# Patient Record
Sex: FEMALE | Race: BLACK OR AFRICAN AMERICAN | NOT HISPANIC OR LATINO | Employment: STUDENT | URBAN - METROPOLITAN AREA
[De-identification: names, ages, dates, MRNs, and addresses within clinical notes are randomized per-mention and may not be internally consistent; named-entity substitution may affect disease eponyms.]

---

## 2017-11-28 ENCOUNTER — ALLIED HEALTH/NURSE VISIT (OUTPATIENT)
Dept: NURSING | Facility: CLINIC | Age: 32
End: 2017-11-28

## 2017-11-28 DIAGNOSIS — Z23 NEED FOR PROPHYLACTIC VACCINATION AND INOCULATION AGAINST INFLUENZA: Primary | ICD-10-CM

## 2017-11-28 PROCEDURE — 90471 IMMUNIZATION ADMIN: CPT

## 2017-11-28 PROCEDURE — 90686 IIV4 VACC NO PRSV 0.5 ML IM: CPT

## 2017-11-28 PROCEDURE — 99207 ZZC NO CHARGE NURSE ONLY: CPT

## 2017-11-28 NOTE — MR AVS SNAPSHOT
"              After Visit Summary   2017    Johnna Maher    MRN: [id number]           Patient Information     Date Of Birth          1985        Visit Information        Provider Department      2017 12:15 PM CARE COORDINATOR [facility]        Today's Diagnoses     Need for prophylactic vaccination and inoculation against influenza    -  1       Follow-ups after your visit        Who to contact     If you have questions or need follow up information about today's clinic visit or your schedule please contact [facility] directly at [phone].  Normal or non-critical lab and imaging results will be communicated to you by Enmetric Systemshart, letter or phone within 4 business days after the clinic has received the results. If you do not hear from us within 7 days, please contact the clinic through SenseDatat or phone. If you have a critical or abnormal lab result, we will notify you by phone as soon as possible.  Submit refill requests through Sensory Networks or call your pharmacy and they will forward the refill request to us. Please allow 3 business days for your refill to be completed.          Additional Information About Your Visit        MyChart Information     Sensory Networks lets you send messages to your doctor, view your test results, renew your prescriptions, schedule appointments and more. To sign up, go to [email or web address] . Click on \"Log in\" on the left side of the screen, which will take you to the Welcome page. Then click on \"Sign up Now\" on the right side of the page.     You will be asked to enter the access code listed below, as well as some personal information. Please follow the directions to create your username and password.     Your access code is: [email or web address]  Expires: 2018 12:39 PM     Your access code will  in 90 days. If you need help or a new code, please call your [facility] or [phone].        Care " EveryWhere ID     This is your Care EveryWhere ID. This could be used by other organizations to access your [facility] medical records  [id number]         Blood Pressure from Last 3 Encounters:   No data found for BP    Weight from Last 3 Encounters:   No data found for Wt              We Performed the Following     ADMIN 1st VACCINE     HC FLU VAC PRESRV FREE QUAD SPLIT VIR 3+YRS IM        Primary Care Provider    None Specified       No primary provider on file.        Equal Access to Services     : Hadii aad ku hadasho Soomaali, waaxda luqadaha, qaybta kaalmada adeegyada, waxay idiin hayaan adeeg ajithkaysh la'aan . So [facility] [phone].    ATENCIÓN: Si habla español, tiene a arce disposición servicios gratuitos de asistencia lingüística. Llame al [phone].    We comply with applicable federal civil rights laws and [place] laws. We do not discriminate on the basis of race, color, national origin, age, disability, sex, sexual orientation, or gender identity.            Thank you!     Thank you for choosing [facility]  for your care. Our goal is always to provide you with excellent care. Hearing back from our patients is one way we can continue to improve our services. Please take a few minutes to complete the written survey that you may receive in the mail after your visit with us. Thank you!             Your Updated Medication List - Protect others around you: Learn how to safely use, store and throw away your medicines at www.disposemymeds.org.      Notice  As of 11/28/2017 12:39 PM    You have not been prescribed any medications.

## 2017-11-28 NOTE — PROGRESS NOTES

## 2017-12-12 ENCOUNTER — RADIANT APPOINTMENT (OUTPATIENT)
Dept: ULTRASOUND IMAGING | Facility: CLINIC | Age: 32
End: 2017-12-12
Payer: COMMERCIAL

## 2017-12-12 ENCOUNTER — OFFICE VISIT (OUTPATIENT)
Dept: OBGYN | Facility: CLINIC | Age: 32
End: 2017-12-12
Payer: COMMERCIAL

## 2017-12-12 VITALS
HEIGHT: 66 IN | SYSTOLIC BLOOD PRESSURE: 102 MMHG | BODY MASS INDEX: 31.85 KG/M2 | DIASTOLIC BLOOD PRESSURE: 70 MMHG | WEIGHT: 198.2 LBS

## 2017-12-12 DIAGNOSIS — R10.2 PELVIC PAIN IN FEMALE: Primary | ICD-10-CM

## 2017-12-12 DIAGNOSIS — R10.2 PELVIC PAIN: ICD-10-CM

## 2017-12-12 PROCEDURE — 99202 OFFICE O/P NEW SF 15 MIN: CPT | Performed by: NURSE PRACTITIONER

## 2017-12-12 PROCEDURE — 76830 TRANSVAGINAL US NON-OB: CPT | Performed by: OBSTETRICS & GYNECOLOGY

## 2017-12-12 ASSESSMENT — ANXIETY QUESTIONNAIRES
3. WORRYING TOO MUCH ABOUT DIFFERENT THINGS: NOT AT ALL
5. BEING SO RESTLESS THAT IT IS HARD TO SIT STILL: NOT AT ALL
2. NOT BEING ABLE TO STOP OR CONTROL WORRYING: NOT AT ALL
GAD7 TOTAL SCORE: 1
1. FEELING NERVOUS, ANXIOUS, OR ON EDGE: NOT AT ALL
7. FEELING AFRAID AS IF SOMETHING AWFUL MIGHT HAPPEN: NOT AT ALL
6. BECOMING EASILY ANNOYED OR IRRITABLE: SEVERAL DAYS
IF YOU CHECKED OFF ANY PROBLEMS ON THIS QUESTIONNAIRE, HOW DIFFICULT HAVE THESE PROBLEMS MADE IT FOR YOU TO DO YOUR WORK, TAKE CARE OF THINGS AT HOME, OR GET ALONG WITH OTHER PEOPLE: NOT DIFFICULT AT ALL

## 2017-12-12 ASSESSMENT — PATIENT HEALTH QUESTIONNAIRE - PHQ9
5. POOR APPETITE OR OVEREATING: NOT AT ALL
SUM OF ALL RESPONSES TO PHQ QUESTIONS 1-9: 3

## 2017-12-12 NOTE — MR AVS SNAPSHOT
"              After Visit Summary   2017    Johnna Maher    MRN: [id number]           Patient Information     Date Of Birth          1985        Visit Information        Provider Department      2017 10:00 AM Elizabeth Gaytan APRN CNP [facility]         Follow-ups after your visit        Follow-up notes from your care team     Return if symptoms worsen or fail to improve.      Who to contact     If you have questions or need follow up information about today's clinic visit or your schedule please contact [facility] directly at [phone].  Normal or non-critical lab and imaging results will be communicated to you by Benefit Mobilehart, letter or phone within 4 business days after the clinic has received the results. If you do not hear from us within 7 days, please contact the clinic through Benefit Mobilehart or phone. If you have a critical or abnormal lab result, we will notify you by phone as soon as possible.  Submit refill requests through Xipin or call your pharmacy and they will forward the refill request to us. Please allow 3 business days for your refill to be completed.          Additional Information About Your Visit        MyChart Information     Xipin lets you send messages to your doctor, view your test results, renew your prescriptions, schedule appointments and more. To sign up, go to [email or web address] . Click on \"Log in\" on the left side of the screen, which will take you to the Welcome page. Then click on \"Sign up Now\" on the right side of the page.     You will be asked to enter the access code listed below, as well as some personal information. Please follow the directions to create your username and password.     Your access code is: [email or web address]  Expires: 2018 12:39 PM     Your access code will  in 90 days. If you need help or a new code, please call your [facility] or [phone].        Care EveryWhere ID     " "This is your Care EveryWhere ID. This could be used by other organizations to access your [facility] medical records  [id number]        Your Vitals Were     Height Last Period BMI (Body Mass Index)             5' 5.5\" (1.664 m) (LMP Unknown) 32.48 kg/m2          Blood Pressure from Last 3 Encounters:   12/12/17 102/70    Weight from Last 3 Encounters:   12/12/17 198 lb 3.2 oz (89.9 kg)              Today, you had the following     No orders found for display       Primary Care Provider Office Phone # Fax #    [facility] [phone]       [facility] [address]  [facility] [address]  [facility] [address]        Equal Access to Services     ISAAC SUN : Hadii florian silvao Sodeaali, waaxda luqadaha, qaybta kaalmada adeegyada, arya murphy . So [facility] [phone].    ATENCIÓN: Si habla español, tiene a arce disposición servicios gratuitos de asistencia lingüística. Llame al [phone].    We comply with applicable federal civil rights laws and [place] laws. We do not discriminate on the basis of race, color, national origin, age, disability, sex, sexual orientation, or gender identity.            Thank you!     Thank you for choosing [facility]  for your care. Our goal is always to provide you with excellent care. Hearing back from our patients is one way we can continue to improve our services. Please take a few minutes to complete the written survey that you may receive in the mail after your visit with us. Thank you!             Your Updated Medication List - Protect others around you: Learn how to safely use, store and throw away your medicines at www.disposemymeds.org.      Notice  As of 12/12/2017 11:59 PM    You have not been prescribed any medications.      "

## 2017-12-12 NOTE — PROGRESS NOTES
"    SUBJECTIVE:                                                   Johnna Maher is a 32 year old female who presents to clinic today for the following health issue(s):  Patient presents with:  Pelvic Pain: patient states she has been having pelvic pain for the last 3 weeks. pain feels like labor pain and happens only at night time, denies any vaginal discharge, bleeding and no urinary symptoms      HPI: Patient been having pelvic pain at night for last 3 weeks.  Denies any vaginal or urinary symptons.  STates bowel movements are normal.      No LMP recorded (lmp unknown)..   Patient is sexually active, No obstetric history on file..  Using IUD for contraception.    reports that she has never smoked. She has never used smokeless tobacco.      STD testing offered?  declined    Health maintenance updated:  yes    Today's PHQ-2 Score: No flowsheet data found.  Today's PHQ-9 Score:   PHQ-9 SCORE 12/12/2017   Total Score 3     Today's NIXON-7 Score:   NIXON-7 SCORE 12/12/2017   Total Score 1       Problem list and histories reviewed & adjusted, as indicated.  Additional history: as documented.    There is no problem list on file for this patient.    No past surgical history on file.   Social History   Substance Use Topics     Smoking status: Never Smoker     Smokeless tobacco: Never Used     Alcohol use Not on file           No current outpatient prescriptions on file.     No current facility-administered medications for this visit.      No Known Allergies    ROS:  12 point review of systems negative other than symptoms noted below.    OBJECTIVE:     /70  Ht 5' 5.5\" (1.664 m)  Wt 198 lb 3.2 oz (89.9 kg)  LMP  (LMP Unknown)  BMI 32.48 kg/m2  Body mass index is 32.48 kg/(m^2).    Exam:  Pelvic Exam:  External Genitalia:     Normal appearance for age, no discharge present, no tenderness present, no inflammatory lesions present, color normal  Vagina:    Normal vaginal vault without central or paravaginal defects, " no discharge present, no inflammatory lesions present, no masses present  Bladder:     Nontender to palpation  Urethra:   Urethral Body:  Urethra palpation normal, urethra structural support normal   Urethral Meatus:  No erythema or lesions present  Cervix:     Appearance healthy, no lesions present, nontender to palpation, no bleeding present, string present  Uterus:     Nontender to palpation, no masses present, position anteflexed, mobility: normal  Adnexa:     No adnexal tenderness present, no adnexal masses present  Perineum:     Perineum within normal limits, no evidence of trauma, no rashes or skin lesions present  Anus:     Anus within normal limits, no hemorrhoids present  Inguinal Lymph Nodes:     No lymphadenopathy present  Pubic Hair:     Normal pubic hair distribution for age  Genitalia and Groin:     No rashes present, no lesions present, no areas of discoloration, no masses present  Discussed US results with patient.  Gynecological Ultrasonography:   Uterus: anteverted  Size: 9.45 x 7.11 x 5.86cm.    Findings: Normal   Endometrium: Thickness total 10.07mm  Findings: IUD in place  Right Ovary: 3.22 x 2.26 x 2.93cm.    Findings: Right collapsed hemorrhagic cyst= 2.2x 1.8x 1.2cm  Left Ovary: 2.35 x 2.11 x 1.79cm.   Cul de Sac/Pouch of Rupesh: No FF       Impression:  IUD centrally located, Collapsing right hemorrhagic cyst     Clarence Handley MD         In-Clinic Test Results:  No results found for this or any previous visit (from the past 24 hour(s)).    ASSESSMENT/PLAN:                                                      No diagnosis found.    There are no Patient Instructions on file for this visit.    Ibuprofen, heating pad.  Return if pain persisting.    [facility]

## 2017-12-13 ASSESSMENT — ANXIETY QUESTIONNAIRES: GAD7 TOTAL SCORE: 1

## 2018-01-02 ENCOUNTER — TRANSFERRED RECORDS (OUTPATIENT)
Dept: HEALTH INFORMATION MANAGEMENT | Facility: CLINIC | Age: 33
End: 2018-01-02

## 2018-05-05 ENCOUNTER — RADIANT APPOINTMENT (OUTPATIENT)
Dept: GENERAL RADIOLOGY | Facility: CLINIC | Age: 33
End: 2018-05-05
Attending: FAMILY MEDICINE
Payer: COMMERCIAL

## 2018-05-05 ENCOUNTER — OFFICE VISIT (OUTPATIENT)
Dept: URGENT CARE | Facility: URGENT CARE | Age: 33
End: 2018-05-05
Payer: COMMERCIAL

## 2018-05-05 VITALS
TEMPERATURE: 98.4 F | DIASTOLIC BLOOD PRESSURE: 82 MMHG | OXYGEN SATURATION: 100 % | BODY MASS INDEX: 32.28 KG/M2 | HEART RATE: 76 BPM | SYSTOLIC BLOOD PRESSURE: 116 MMHG | WEIGHT: 197 LBS

## 2018-05-05 DIAGNOSIS — R10.9 FLANK PAIN: ICD-10-CM

## 2018-05-05 DIAGNOSIS — R10.9 FLANK PAIN: Primary | ICD-10-CM

## 2018-05-05 DIAGNOSIS — D69.1 ABNORMAL PLATELETS (H): ICD-10-CM

## 2018-05-05 DIAGNOSIS — K59.01 SLOW TRANSIT CONSTIPATION: ICD-10-CM

## 2018-05-05 LAB
ALBUMIN SERPL-MCNC: 3.8 G/DL (ref 3.4–5)
ALBUMIN UR-MCNC: NEGATIVE MG/DL
ALP SERPL-CCNC: 53 U/L (ref 40–150)
ALT SERPL W P-5'-P-CCNC: 27 U/L (ref 0–50)
ANION GAP SERPL CALCULATED.3IONS-SCNC: 10 MMOL/L (ref 3–14)
APPEARANCE UR: CLEAR
AST SERPL W P-5'-P-CCNC: 14 U/L (ref 0–45)
BASOPHILS # BLD AUTO: 0 10E9/L (ref 0–0.2)
BASOPHILS NFR BLD AUTO: 0.1 %
BILIRUB SERPL-MCNC: 0.5 MG/DL (ref 0.2–1.3)
BILIRUB UR QL STRIP: NEGATIVE
BUN SERPL-MCNC: 10 MG/DL (ref 7–30)
CALCIUM SERPL-MCNC: 9.3 MG/DL (ref 8.5–10.1)
CHLORIDE SERPL-SCNC: 106 MMOL/L (ref 94–109)
CO2 SERPL-SCNC: 25 MMOL/L (ref 20–32)
COLOR UR AUTO: YELLOW
CREAT SERPL-MCNC: 0.71 MG/DL (ref 0.52–1.04)
CRP SERPL-MCNC: 17 MG/L (ref 0–8)
DIFFERENTIAL METHOD BLD: ABNORMAL
EOSINOPHIL # BLD AUTO: 0.1 10E9/L (ref 0–0.7)
EOSINOPHIL NFR BLD AUTO: 1 %
ERYTHROCYTE [DISTWIDTH] IN BLOOD BY AUTOMATED COUNT: 14.9 % (ref 10–15)
ERYTHROCYTE [SEDIMENTATION RATE] IN BLOOD BY WESTERGREN METHOD: 18 MM/H (ref 0–20)
GFR SERPL CREATININE-BSD FRML MDRD: >90 ML/MIN/1.7M2
GLUCOSE SERPL-MCNC: 93 MG/DL (ref 70–99)
GLUCOSE UR STRIP-MCNC: NEGATIVE MG/DL
HCT VFR BLD AUTO: 33.4 % (ref 35–47)
HGB BLD-MCNC: 11.7 G/DL (ref 11.7–15.7)
HGB UR QL STRIP: NEGATIVE
KETONES UR STRIP-MCNC: NEGATIVE MG/DL
LEUKOCYTE ESTERASE UR QL STRIP: ABNORMAL
LIPASE SERPL-CCNC: 101 U/L (ref 73–393)
LYMPHOCYTES # BLD AUTO: 2.3 10E9/L (ref 0.8–5.3)
LYMPHOCYTES NFR BLD AUTO: 28.3 %
MCH RBC QN AUTO: 25.7 PG (ref 26.5–33)
MCHC RBC AUTO-ENTMCNC: 35 G/DL (ref 31.5–36.5)
MCV RBC AUTO: 73 FL (ref 78–100)
MONOCYTES # BLD AUTO: 0.8 10E9/L (ref 0–1.3)
MONOCYTES NFR BLD AUTO: 9.9 %
NEUTROPHILS # BLD AUTO: 5 10E9/L (ref 1.6–8.3)
NEUTROPHILS NFR BLD AUTO: 60.7 %
NITRATE UR QL: NEGATIVE
NON-SQ EPI CELLS #/AREA URNS LPF: NORMAL /LPF
PH UR STRIP: 7.5 PH (ref 5–7)
PLATELET # BLD AUTO: 218 10E9/L (ref 150–450)
POTASSIUM SERPL-SCNC: 4.3 MMOL/L (ref 3.4–5.3)
PROT SERPL-MCNC: 8 G/DL (ref 6.8–8.8)
RBC # BLD AUTO: 4.55 10E12/L (ref 3.8–5.2)
RBC #/AREA URNS AUTO: NORMAL /HPF
SODIUM SERPL-SCNC: 141 MMOL/L (ref 133–144)
SOURCE: ABNORMAL
SP GR UR STRIP: 1.02 (ref 1–1.03)
SPECIMEN SOURCE: ABNORMAL
UROBILINOGEN UR STRIP-ACNC: 0.2 EU/DL (ref 0.2–1)
WBC # BLD AUTO: 8.2 10E9/L (ref 4–11)
WBC #/AREA URNS AUTO: NORMAL /HPF
WET PREP SPEC: ABNORMAL

## 2018-05-05 PROCEDURE — 86140 C-REACTIVE PROTEIN: CPT | Performed by: FAMILY MEDICINE

## 2018-05-05 PROCEDURE — 83690 ASSAY OF LIPASE: CPT | Performed by: FAMILY MEDICINE

## 2018-05-05 PROCEDURE — 36415 COLL VENOUS BLD VENIPUNCTURE: CPT | Performed by: FAMILY MEDICINE

## 2018-05-05 PROCEDURE — 80053 COMPREHEN METABOLIC PANEL: CPT | Performed by: FAMILY MEDICINE

## 2018-05-05 PROCEDURE — 74019 RADEX ABDOMEN 2 VIEWS: CPT

## 2018-05-05 PROCEDURE — 81001 URINALYSIS AUTO W/SCOPE: CPT | Performed by: PHYSICIAN ASSISTANT

## 2018-05-05 PROCEDURE — 85652 RBC SED RATE AUTOMATED: CPT | Performed by: FAMILY MEDICINE

## 2018-05-05 PROCEDURE — 85025 COMPLETE CBC W/AUTO DIFF WBC: CPT | Performed by: FAMILY MEDICINE

## 2018-05-05 PROCEDURE — 87210 SMEAR WET MOUNT SALINE/INK: CPT | Performed by: PHYSICIAN ASSISTANT

## 2018-05-05 PROCEDURE — 99204 OFFICE O/P NEW MOD 45 MIN: CPT | Performed by: FAMILY MEDICINE

## 2018-05-05 NOTE — MR AVS SNAPSHOT
After Visit Summary   5/5/2018    Johnna Maher    MRN: [id number]           Patient Information     Date Of Birth          1985        Visit Information        Provider Department      5/5/2018 9:00 AM Anyi Quintero MD [facility]        Today's Diagnoses     Flank pain    -  1      Care Instructions      Constipation (Adult)  Constipation means that you have bowel movements that are less frequent than usual. Stools often become very hard and difficult to pass.  Constipation is very common. At some point in life it affects almost everyone. Since everyone's bowel habits are different, what is constipation to one person may not be to another. Your healthcare provider may do tests to diagnose constipation. It depends on what he or she finds when evaluating you.    Symptoms of constipation include:    Abdominal pain    Bloating    Vomiting    Painful bowel movements    Itching, swelling, bleeding, or pain around the anus  Causes  Constipation can have many causes. These include:    Diet low in fiber    Too much dairy    Not drinking enough liquids    Lack of exercise or physical activity. This is especially true for older adults.    Changes in lifestyle or daily routine, including pregnancy, aging, work, and travel    Frequent use or misuse of laxatives    Ignoring the urge to have a bowel movement or delaying it until later    Medicines, such as certain prescription pain medicines, iron supplements, antacids, certain antidepressants, and calcium supplements    Diseases like irritable bowel syndrome, bowel obstructions, stroke, diabetes, thyroid disease, Parkinson disease, hemorrhoids, and colon cancer  Complications  Potential complications of constipation can include:    Hemorrhoids    Rectal bleeding from hemorrhoids or anal fissures (skin tears)    Hernias    Dependency on laxatives    Chronic constipation    Fecal impaction    Bowel obstruction or  perforation  Home care  All treatment should be done after talking with your healthcare provider. This is especially true if you have another medical problems, are taking prescription medicines, or are an older adult. Treatment most often involves lifestyle changes. You may also need medicines. Your healthcare provider will tell you which will work best for you. Follow the advice below to help avoid this problem in the future.  Lifestyle changes  These lifestyle changes can help prevent constipation:    Diet. Eat a high-fiber diet, with fresh fruit and vegetables, and reduce dairy intake, meats, and processed foods    Fluids. It's important to get enough fluids each day. Drink plenty of water when you eat more fiber. If you are on diet that limits the amount of fluid you can have, talk about this with your healthcare provider.    Regular exercise. Check with your healthcare provider first.  Medicines  Take any medicines as directed. Some laxatives are safe to use only every now and then. Others can be taken on a regular basis. Talk with your doctor or pharmacist if you have questions.  Prescription pain medicines can cause constipation. If you are taking this kind of medicine, ask your healthcare provider if you should also take a stool softener.  Medicines you may take to treat constipation include:    Fiber supplements    Stool softeners    Laxatives    Enemas    Rectal suppositories  Follow-up care  Follow up with your healthcare provider if symptoms don't get better in the next few days. You may need to have more tests or see a specialist.  Call 911  Call 911 if any of these occur:    Trouble breathing    Stiff, rigid abdomen that is severely painful to touch    Confusion    Fainting or loss of consciousness    Rapid heart rate    Chest pain  When to seek medical advice  Call your healthcare provider right away if any of these occur:    Fever of 100.4 F (38 C) or higher, or as directed by your healthcare  provider    Failure to resume normal bowel movements    Pain in your abdomen or back gets worse    Nausea or vomiting    Swelling in your abdomen    Blood in the stool    Black, tarry stool    Involuntary weight loss    Weakness  Date Last Reviewed: 12/30/2015 2000-2017 [employer]. [address] [place] [address], [place] [address]. All rights reserved. This information is not intended as a substitute for professional medical care. Always follow your healthcare professional's instructions.        Treating Constipation    Constipation is a common and often uncomfortable problem. Constipation means you have bowel movements fewer than 3 times per week, or strain to pass hard, dry stool. It can last a short time. Or it can be a problem that never seems to go away. The good news is that it can often be treated and controlled.  Eat more fiber  One of the best ways to help treat constipation is to increase your fiber intake. You can do this either through diet or by using fiber supplements. Fiber (in whole grains, fruits, and vegetables) adds bulk and absorbs water to soften the stool. This helps the stool pass through the colon more easily. When you increase your fiber intake, do it slowly to avoid side effects such as bloating. Also increase the amount of water that you drink. Eating more of the following foods can add fiber to your diet.    High-fiber cereals    Whole grains, bran, and brown rice    Vegetables such as carrots, broccoli, and greens    Fresh fruits (especially apples, pears, and dried fruits like raisins and apricots)    Nuts and legumes (especially beans such as lentils, kidney beans, and lima beans)  Get physically active  Exercise helps improve the working of your colon which helps ease constipation. Try to get some physical activity every day. If you haven t been active for a while, talk to your healthcare provider before starting again.  Laxatives  Your healthcare provider may suggest an  "over-the-counter product to help ease your constipation. He or she may suggest the use of bulk-forming agents or laxatives. The use of laxatives, if used as directed, is common and safe. Follow directions carefully when using them. See your healthcare provider for new-onset constipation, or long-term constipation, to rule out other causes such as medicines or thyroid disease.  Date Last Reviewed: 7/1/2016 2000-2017 [employer]. [address] [place] [address], [place] [address]. All rights reserved. This information is not intended as a substitute for professional medical care. Always follow your healthcare professional's instructions.                Follow-ups after your visit        Who to contact     If you have questions or need follow up information about today's clinic visit or your schedule please contact [facility] directly at [phone].  Normal or non-critical lab and imaging results will be communicated to you by MyChart, letter or phone within 4 business days after the clinic has received the results. If you do not hear from us within 7 days, please contact the clinic through untapthart or phone. If you have a critical or abnormal lab result, we will notify you by phone as soon as possible.  Submit refill requests through MIGSIF or call your pharmacy and they will forward the refill request to us. Please allow 3 business days for your refill to be completed.          Additional Information About Your Visit        MyChart Information     MIGS[employer] lets you send messages to your doctor, view your test results, renew your prescriptions, schedule appointments and more. To sign up, go to [email or web address] . Click on \"Log in\" on the left side of the screen, which will take you to the Welcome page. Then click on \"Sign up Now\" on the right side of the page.     You will be asked to enter the access code listed below, as well as some personal information. Please follow the " directions to create your username and password.     Your access code is: [email or web address]  Expires: 8/3/2018 10:39 AM     Your access code will  in 90 days. If you need help or a new code, please call your [facility] clinic or [phone].        Care EveryWhere ID     This is your Care EveryWhere ID. This could be used by other organizations to access your [facility] medical records  [id number]        Your Vitals Were     Pulse Temperature Last Period Pulse Oximetry BMI (Body Mass Index)       76 98.4  F (36.9  C) (Tympanic) 2018 100% 32.28 kg/m2        Blood Pressure from Last 3 Encounters:   18 116/82   17 102/70    Weight from Last 3 Encounters:   18 197 lb (89.4 kg)   17 198 lb 3.2 oz (89.9 kg)              We Performed the Following     *UA reflex to Microscopic and Culture ([place] and [facility] (except [place] and [place])     CBC with platelets differential     Comprehensive metabolic panel     CRP inflammation     Erythrocyte sedimentation rate auto     Lipase     Urine Microscopic     Wet prep        Primary Care Provider Office Phone # Fax #    [facility] [phone]       [facility] [address]  [facility] [address]  [facility] [address]        Equal Access to Services     ISAAC SUN : Hadii aad ku hadasho Soomaali, waaxda luqadaha, qaybta kaalmada adeegyada, arya holguin. So [facility] [phone].    ATENCIÓN: Si habla español, tiene a arce disposición servicios gratuitos de asistencia lingüística. Llame al [phone].    We comply with applicable federal civil rights laws and [place] laws. We do not discriminate on the basis of race, color, national origin, age, disability, sex, sexual orientation, or gender identity.            Thank you!     Thank you for choosing [facility]  for your care. Our goal is always to provide you with excellent care.  Hearing back from our patients is one way we can continue to improve our services. Please take a few minutes to complete the written survey that you may receive in the mail after your visit with us. Thank you!             Your Updated Medication List - Protect others around you: Learn how to safely use, store and throw away your medicines at www.disposemymeds.org.      Notice  As of 5/5/2018 10:40 AM    You have not been prescribed any medications.

## 2018-05-05 NOTE — PROGRESS NOTES
SUBJECTIVE:    Chief Complaint   Patient presents with     Urgent Care     Pt in clinic c/o right side flank/abdominal pain for 2 days.     Flank Pain     Abdominal Pain     HPI:  Johnna Maher is a 32 year old female who presents with the CC of abdominal / right flank pain.    Pain is located in the RUQ and right side area, with radiation to None and back.  The pain is characterized as aching and cramping,   Pain at worst is a level 6 on a scale of 1-10.   Pain has been present for 2 day(s) and is slowly progressive.  EXACERBATING FACTORS: movement/walking and bending  RELIEVING FACTORS: nothing.  ASSOCIATED SX: none.  Patient denies nausea, vomiting, diarrhea, constipation, bloating, melena, hematochezia, dysuria, frequency, fever, chills, myalgias and headache     Last time the patient passed stool- yesterday, formed  Last time the patient urinated- today, no dysuria  Last time the patient was eating/ drinking -today, normal appetite    Surgical History :      none  Colon or bowel surgery -no  Diverticulitis history - no  Endometriosis -no    She had a fall about 10 days ago with 3 days of back pain, that resolved.  She associates the current pain with a long time she was waiting at a bus stop 2 days ago       PMH:  No history of chronic health conditions    ALLERGIES:  Review of patient's allergies indicates no known allergies.      No current outpatient prescriptions on file prior to visit.  No current facility-administered medications on file prior to visit.     Social History   Substance Use Topics     Smoking status: Never Smoker     Smokeless tobacco: Never Used     Alcohol use Not on file     Family History:  Non-contributory,  No associated family health conditions    Review Of Systems    Constitutional:  Negative for fevers, chills, fatigue  Skin: negative for rash or lesions  Eyes: negative for eye pain, visual changes  Ears/Nose/Throat: negative for earache  , sinus trouble, persistent sore  throat  Respiratory: No shortness of breath, dyspnea on exertion     Cardiovascular: negative for, palpitations, tachycardia   Gastrointestinal: negative for vomiting, abdominal pain and diarrhea  Genitourinary: negative for dysuria and hematuria  Back: negative for    CVA pain-  Previous back pain resolved  Musculoskeletal: negative for generalized joint pain, joint swelling and joint stiffness  Neurologic: negative for generalized or local weakness or incoordination       OBJECTIVE:  /82  Pulse 76  Temp 98.4  F (36.9  C) (Tympanic)  Wt 197 lb (89.4 kg)  LMP 04/30/2018  SpO2 100%  BMI 32.28 kg/m2  GENERAL APPEARANCE: alert, moderate distress and cooperative  EYES: EOMI,  PERRL, conjunctiva clear  HENT: ear canals and TM's normal.  Nose and mouth without ulcers, erythema or lesions  NECK: supple, nontender, no lymphadenopathy  RESP: lungs clear to auscultation - no rales, rhonchi or wheezes  CV: regular rates and rhythm, normal S1 S2, no murmur noted  ABDOMEN: obese, soft, normal bowel sounds, tenderness moderate RUQ and right flank  NEURO: Normal strength and tone, sensory exam grossly normal,  normal speech and mentation  SKIN: no suspicious lesions or rashes      Labs:    Results for orders placed or performed in visit on 05/05/18   *UA reflex to Microscopic and Culture ([place] [facility] (except [place] and [place])   Result Value Ref Range    Color Urine Yellow     Appearance Urine Clear     Glucose Urine Negative NEG^Negative mg/dL    Bilirubin Urine Negative NEG^Negative    Ketones Urine Negative NEG^Negative mg/dL    Specific Gravity Urine 1.020 1.003 - 1.035    Blood Urine Negative NEG^Negative    pH Urine 7.5 (H) 5.0 - 7.0 pH    Protein Albumin Urine Negative NEG^Negative mg/dL    Urobilinogen Urine 0.2 0.2 - 1.0 EU/dL    Nitrite Urine Negative NEG^Negative    Leukocyte Esterase Urine Trace (A) NEG^Negative    Source Midstream Urine    Urine Microscopic   Result Value Ref Range     WBC Urine 0 - 5 OTO5^0 - 5 /HPF    RBC Urine O - 2 OTO2^O - 2 /HPF    Squamous Epithelial /LPF Urine Few FEW^Few /LPF   CBC with platelets differential   Result Value Ref Range    WBC 8.2 4.0 - 11.0 10e9/L    RBC Count 4.55 3.8 - 5.2 10e12/L    Hemoglobin 11.7 11.7 - 15.7 g/dL    Hematocrit 33.4 (L) 35.0 - 47.0 %    MCV 73 (L) 78 - 100 fl    MCH 25.7 (L) 26.5 - 33.0 pg    MCHC 35.0 31.5 - 36.5 g/dL    RDW 14.9 10.0 - 15.0 %    Platelet Count 218 150 - 450 10e9/L    Diff Method Automated Method     % Neutrophils 60.7 %    % Lymphocytes 28.3 %    % Monocytes 9.9 %    % Eosinophils 1.0 %    % Basophils 0.1 %    Absolute Neutrophil 5.0 1.6 - 8.3 10e9/L    Absolute Lymphocytes 2.3 0.8 - 5.3 10e9/L    Absolute Monocytes 0.8 0.0 - 1.3 10e9/L    Absolute Eosinophils 0.1 0.0 - 0.7 10e9/L    Absolute Basophils 0.0 0.0 - 0.2 10e9/L   Erythrocyte sedimentation rate auto   Result Value Ref Range    Sed Rate 18 0 - 20 mm/h   Wet prep   Result Value Ref Range    Specimen Description Vagina     Wet Prep Yeast seen (A)     Wet Prep No clue cells seen     Wet Prep No Trichomonas seen      On CBC an abnormal pattern was noted of platelet size        Abdominal x-ray: No obstruction, no free air,  No dilated bowel,  increased amount of stool, especially right side    ASSESSMENT:  Flank pain     - *UA reflex to Microscopic and Culture ([facility] [place] (except [place] and [place])  - Wet prep  - Urine Microscopic  - Comprehensive metabolic panel  - Lipase  - XR Abdomen 2 Views; Future  - CBC with platelets differential  - Erythrocyte sedimentation rate auto  - CRP inflammation       We discussed some of the many possible causes of abdominal pain including appendicitis, cholecystitis, diverticulitis, hepatitis, colitis, gastritis,  A stone in the gallbladder, pancreas, kidney or ureter, constipation, obstructed bowel, kidney or bladder infection, cancers, UTI,        We also discussed the limited capacity to evaluate these  conditions in the urgent care.  The patient requested to have the evaluation at urgent care, not at the ER, understanding the limitations of diagnostic capabilities    Discussed that the lab results did not identify the cause of the abdominal pain, but the testing has confirmed that  He/she does not have some important causes of pain  Labs indicate unlikely urinary tract/ kidney infection or stone,  There is unlikely significant intraabdominal infection like appendicitis, cholecystitis or diverticulitis,  no bowel obstruction, no significant constipation.  Testing for inflammation of liver, gallbladder and pancreas is pending.       We discussed that the exams performed today do NOT have the characteristics of an illness requiring emergent evaluation and treatment in an Emergency Department and hospital  Patient was counselled that if the abdominal symptoms worsen, especially with worsening pain, associated fever, chills, and/or vomiting with inability to keep down foods and liquids, blood in the urine, stool or vomitus  that they should be re-evaluated in the Emergency Department.          Slow transit constipation  Recommend stool softening with fruit/ vegetables or OTC products to see if pain improves    Abnormal platelets (H)   Discussed with patient that platelet abnormality is likely unrelated to the current symptoms,  May be a hereditary condition  Recommend recheck cbc in 2-3 months,  If persistent platelet abnormalities she should have pathology/ hematology evaluate her blood smear

## 2018-05-05 NOTE — PATIENT INSTRUCTIONS
Constipation (Adult)  Constipation means that you have bowel movements that are less frequent than usual. Stools often become very hard and difficult to pass.  Constipation is very common. At some point in life it affects almost everyone. Since everyone's bowel habits are different, what is constipation to one person may not be to another. Your healthcare provider may do tests to diagnose constipation. It depends on what he or she finds when evaluating you.    Symptoms of constipation include:    Abdominal pain    Bloating    Vomiting    Painful bowel movements    Itching, swelling, bleeding, or pain around the anus  Causes  Constipation can have many causes. These include:    Diet low in fiber    Too much dairy    Not drinking enough liquids    Lack of exercise or physical activity. This is especially true for older adults.    Changes in lifestyle or daily routine, including pregnancy, aging, work, and travel    Frequent use or misuse of laxatives    Ignoring the urge to have a bowel movement or delaying it until later    Medicines, such as certain prescription pain medicines, iron supplements, antacids, certain antidepressants, and calcium supplements    Diseases like irritable bowel syndrome, bowel obstructions, stroke, diabetes, thyroid disease, Parkinson disease, hemorrhoids, and colon cancer  Complications  Potential complications of constipation can include:    Hemorrhoids    Rectal bleeding from hemorrhoids or anal fissures (skin tears)    Hernias    Dependency on laxatives    Chronic constipation    Fecal impaction    Bowel obstruction or perforation  Home care  All treatment should be done after talking with your healthcare provider. This is especially true if you have another medical problems, are taking prescription medicines, or are an older adult. Treatment most often involves lifestyle changes. You may also need medicines. Your healthcare provider will tell you which will work best for you. Follow  the advice below to help avoid this problem in the future.  Lifestyle changes  These lifestyle changes can help prevent constipation:    Diet. Eat a high-fiber diet, with fresh fruit and vegetables, and reduce dairy intake, meats, and processed foods    Fluids. It's important to get enough fluids each day. Drink plenty of water when you eat more fiber. If you are on diet that limits the amount of fluid you can have, talk about this with your healthcare provider.    Regular exercise. Check with your healthcare provider first.  Medicines  Take any medicines as directed. Some laxatives are safe to use only every now and then. Others can be taken on a regular basis. Talk with your doctor or pharmacist if you have questions.  Prescription pain medicines can cause constipation. If you are taking this kind of medicine, ask your healthcare provider if you should also take a stool softener.  Medicines you may take to treat constipation include:    Fiber supplements    Stool softeners    Laxatives    Enemas    Rectal suppositories  Follow-up care  Follow up with your healthcare provider if symptoms don't get better in the next few days. You may need to have more tests or see a specialist.  Call 911  Call 911 if any of these occur:    Trouble breathing    Stiff, rigid abdomen that is severely painful to touch    Confusion    Fainting or loss of consciousness    Rapid heart rate    Chest pain  When to seek medical advice  Call your healthcare provider right away if any of these occur:    Fever of 100.4 F (38 C) or higher, or as directed by your healthcare provider    Failure to resume normal bowel movements    Pain in your abdomen or back gets worse    Nausea or vomiting    Swelling in your abdomen    Blood in the stool    Black, tarry stool    Involuntary weight loss    Weakness  Date Last Reviewed: 12/30/2015 2000-2017 [employer]. [address] [place] [address], [place] [address]. All rights reserved. This  information is not intended as a substitute for professional medical care. Always follow your healthcare professional's instructions.        Treating Constipation    Constipation is a common and often uncomfortable problem. Constipation means you have bowel movements fewer than 3 times per week, or strain to pass hard, dry stool. It can last a short time. Or it can be a problem that never seems to go away. The good news is that it can often be treated and controlled.  Eat more fiber  One of the best ways to help treat constipation is to increase your fiber intake. You can do this either through diet or by using fiber supplements. Fiber (in whole grains, fruits, and vegetables) adds bulk and absorbs water to soften the stool. This helps the stool pass through the colon more easily. When you increase your fiber intake, do it slowly to avoid side effects such as bloating. Also increase the amount of water that you drink. Eating more of the following foods can add fiber to your diet.    High-fiber cereals    Whole grains, bran, and brown rice    Vegetables such as carrots, broccoli, and greens    Fresh fruits (especially apples, pears, and dried fruits like raisins and apricots)    Nuts and legumes (especially beans such as lentils, kidney beans, and lima beans)  Get physically active  Exercise helps improve the working of your colon which helps ease constipation. Try to get some physical activity every day. If you haven t been active for a while, talk to your healthcare provider before starting again.  Laxatives  Your healthcare provider may suggest an over-the-counter product to help ease your constipation. He or she may suggest the use of bulk-forming agents or laxatives. The use of laxatives, if used as directed, is common and safe. Follow directions carefully when using them. See your healthcare provider for new-onset constipation, or long-term constipation, to rule out other causes such as medicines or thyroid  disease.  Date Last Reviewed: 7/1/2016 2000-2017 [employer]. [address] [place] [address], [place] [address]. All rights reserved. This information is not intended as a substitute for professional medical care. Always follow your healthcare professional's instructions.

## 2018-05-08 ENCOUNTER — HEALTH MAINTENANCE LETTER (OUTPATIENT)
Age: 33
End: 2018-05-08

## 2018-05-09 ENCOUNTER — OFFICE VISIT (OUTPATIENT)
Dept: OBGYN | Facility: CLINIC | Age: 33
End: 2018-05-09
Payer: COMMERCIAL

## 2018-05-09 VITALS
BODY MASS INDEX: 32.82 KG/M2 | HEART RATE: 68 BPM | WEIGHT: 197 LBS | DIASTOLIC BLOOD PRESSURE: 58 MMHG | SYSTOLIC BLOOD PRESSURE: 100 MMHG | HEIGHT: 65 IN

## 2018-05-09 DIAGNOSIS — Z01.419 ENCOUNTER FOR GYNECOLOGICAL EXAMINATION WITHOUT ABNORMAL FINDING: Primary | ICD-10-CM

## 2018-05-09 PROCEDURE — 99395 PREV VISIT EST AGE 18-39: CPT | Performed by: NURSE PRACTITIONER

## 2018-05-09 PROCEDURE — G0145 SCR C/V CYTO,THINLAYER,RESCR: HCPCS | Performed by: NURSE PRACTITIONER

## 2018-05-09 PROCEDURE — G0476 HPV COMBO ASSAY CA SCREEN: HCPCS | Performed by: NURSE PRACTITIONER

## 2018-05-09 ASSESSMENT — ANXIETY QUESTIONNAIRES
5. BEING SO RESTLESS THAT IT IS HARD TO SIT STILL: NOT AT ALL
GAD7 TOTAL SCORE: 0
6. BECOMING EASILY ANNOYED OR IRRITABLE: NOT AT ALL
IF YOU CHECKED OFF ANY PROBLEMS ON THIS QUESTIONNAIRE, HOW DIFFICULT HAVE THESE PROBLEMS MADE IT FOR YOU TO DO YOUR WORK, TAKE CARE OF THINGS AT HOME, OR GET ALONG WITH OTHER PEOPLE: NOT DIFFICULT AT ALL
2. NOT BEING ABLE TO STOP OR CONTROL WORRYING: NOT AT ALL
1. FEELING NERVOUS, ANXIOUS, OR ON EDGE: NOT AT ALL
3. WORRYING TOO MUCH ABOUT DIFFERENT THINGS: NOT AT ALL
7. FEELING AFRAID AS IF SOMETHING AWFUL MIGHT HAPPEN: NOT AT ALL

## 2018-05-09 ASSESSMENT — PATIENT HEALTH QUESTIONNAIRE - PHQ9: 5. POOR APPETITE OR OVEREATING: NOT AT ALL

## 2018-05-09 NOTE — MR AVS SNAPSHOT
"              After Visit Summary   5/9/2018    Johnna Maher    MRN: [id number]           Patient Information     Date Of Birth          1985        Visit Information        Provider Department      5/9/2018 9:30 AM Elizabeth Gaytan APRN CNP [facility]        Today's Diagnoses     Encounter for gynecological examination without abnormal finding    -  1       Follow-ups after your visit        Follow-up notes from your care team     Return in about 1 year (around 5/9/2019) for Routine Visit.      Who to contact     If you have questions or need follow up information about today's clinic visit or your schedule please contact [facility] directly at [phone].  Normal or non-critical lab and imaging results will be communicated to you by [employer], letter or phone within 4 business days after the clinic has received the results. If you do not hear from us within 7 days, please contact the clinic through [employer] or phone. If you have a critical or abnormal lab result, we will notify you by phone as soon as possible.  Submit refill requests through [employer] or call your pharmacy and they will forward the refill request to us. Please allow 3 business days for your refill to be completed.          Additional Information About Your Visit        MyChart Information     [employer] gives you secure access to your electronic health record. If you see a primary care provider, you can also send messages to your care team and make appointments. If you have questions, please call your primary care clinic.  If you do not have a primary care provider, please call [phone] and they will assist you.        Care EveryWhere ID     This is your Care EveryWhere ID. This could be used by other organizations to access your [place] medical records  [id number]        Your Vitals Were     Pulse Height Last Period BMI (Body Mass Index)          68 5' 5\" (1.651 m) 04/28/2018 32.78 " kg/m2         Blood Pressure from Last 3 Encounters:   05/09/18 100/58   05/05/18 116/82   12/12/17 102/70    Weight from Last 3 Encounters:   05/09/18 197 lb (89.4 kg)   05/05/18 197 lb (89.4 kg)   12/12/17 198 lb 3.2 oz (89.9 kg)              We Performed the Following     HPV High Risk Types DNA Cervical     Pap imaged thin layer screen with HPV - recommended age 30 - 65        Primary Care Provider Office Phone # Fax #    [facility] [phone]       [facility] [address]  [facility] [address]  [facility] [address]        Equal Access to Services     ISAAC SUN : Hadii florian silvao Mack, waaxda lukymberlyadaha, qaybta kaalmada aderobert, arya holguin. So [facility] [phone].    ATENCIÓN: Si habla español, tiene a arce disposición servicios gratuitos de asistencia lingüística. Llame al [phone].    We comply with applicable federal civil rights laws and [place] laws. We do not discriminate on the basis of race, color, national origin, age, disability, sex, sexual orientation, or gender identity.            Thank you!     Thank you for choosing [facility]  for your care. Our goal is always to provide you with excellent care. Hearing back from our patients is one way we can continue to improve our services. Please take a few minutes to complete the written survey that you may receive in the mail after your visit with us. Thank you!             Your Updated Medication List - Protect others around you: Learn how to safely use, store and throw away your medicines at www.disposemymeds.org.      Notice  As of 5/9/2018 10:11 AM    You have not been prescribed any medications.

## 2018-05-09 NOTE — PROGRESS NOTES
"  Johnna is a 32 year old No obstetric history on file. female who presents for annual exam.     Besides routine health maintenance, she has no other health concerns today .    HPI: here for annual exam, no other concerns today.    The patient's PCP is [facility].        GYNECOLOGIC HISTORY:    Patient's last menstrual period was 04/28/2018.  Her current contraception method is: IUD.  She  reports that she has never smoked. She has never used smokeless tobacco.    Patient is sexually active.  STD testing offered?  Declined  Last PHQ-9 score on record =   PHQ-9 SCORE 5/9/2018   Total Score 0     Last GAD7 score on record =   NIXON-7 SCORE 5/9/2018   Total Score 0     Alcohol Score = 0    HEALTH MAINTENANCE:  Cholesterol: (No results found for: CHOL   Last Mammo: never, Result: not applicable, Next Mammo: age.42.    Pap: (No results found for: PAP  Colonoscopy:  never, Result: not applicable, Next Colonoscopy: due at age 52.  Dexa:  Na     Health maintenance updated:  yes    HISTORY:  Obstetric History     No data available          There is no problem list on file for this patient.    No past surgical history on file.   Social History   Substance Use Topics     Smoking status: Never Smoker     Smokeless tobacco: Never Used     Alcohol use Not on file           No current outpatient prescriptions on file.     No current facility-administered medications for this visit.      No Known Allergies    Past medical, surgical, social and family histories were reviewed and updated in EPIC.    ROS:       EXAM:  /58  Pulse 68  Ht 5' 5\" (1.651 m)  Wt 197 lb (89.4 kg)  LMP 04/28/2018  BMI 32.78 kg/m2   BMI: Body mass index is 32.78 kg/(m^2).    PHYSICAL EXAM:  Constitutional:  Appearance: Well nourished, well developed, alert, in no acute distress  Neck:  Lymph Nodes:  No lymphadenopathy present    Thyroid:  Gland size normal, nontender, no nodules or masses present  on " palpation  Chest:  Respiratory Effort:  Breathing unlabored  Cardiovascular:    Heart: Auscultation:  Regular rate, normal rhythm, no murmurs present  Breasts: Inspection of Breasts:  No lymphadenopathy present., Palpation of Breasts and Axillae:  No masses present on palpation, no breast tenderness., Axillary Lymph Nodes:  No lymphadenopathy present. and No nodularity, asymmetry or nipple discharge bilaterally. taught SBE  Gastrointestinal:   Abdominal Examination:  Abdomen nontender to palpation, tone normal without rigidity or guarding, no masses present, umbilicus without lesions   Liver and Spleen:  No hepatomegaly present, liver nontender to palpation    Hernias:  No hernias present  Lymphatic: Lymph Nodes:  No other lymphadenopathy present  Skin:  General Inspection:  No rashes present, no lesions present, no areas of  discoloration    Genitalia and Groin:  No rashes present, no lesions present, no areas of  discoloration, no masses present  Neurologic/Psychiatric:    Mental Status:  Oriented X3     Pelvic Exam:  External Genitalia:     Normal appearance for age, no discharge present, no tenderness present, no inflammatory lesions present, color normal  Vagina:     Normal vaginal vault without central or paravaginal defects, no discharge present, no inflammatory lesions present, no masses present  Bladder:     Nontender to palpation  Urethra:   Urethral Body:  Urethra palpation normal, urethra structural support normal   Urethral Meatus:  No erythema or lesions present  Cervix:     Appearance healthy, no lesions present, nontender to palpation, no bleeding present  Uterus:     Uterus: firm, normal sized and nontender, midplane in position.   Adnexa:     No adnexal tenderness present, no adnexal masses present  Perineum:     Perineum within normal limits, no evidence of trauma, no rashes or skin lesions present  Anus:     Anus within normal limits, no hemorrhoids present  Inguinal Lymph Nodes:     No  lymphadenopathy present  Pubic Hair:     Normal pubic hair distribution for age  Genitalia and Groin:     No rashes present, no lesions present, no areas of discoloration, no masses present      COUNSELING:   Reviewed preventive health counseling, as reflected in patient instructions       Regular exercise       Healthy diet/nutrition       Contraception       Safe sex practices/STD prevention    BMI: Body mass index is 32.78 kg/(m^2).      ASSESSMENT:  32 year old female with satisfactory annual exam.    ICD-10-CM    1. Encounter for gynecological examination without abnormal finding Z01.419 Pap imaged thin layer screen with HPV - recommended age 30 - 65     HPV High Risk Types DNA Cervical       PLAN:  Normal Gyn exam.  Return prn or 1 year for annual and pap smear.    Elizabeth Gaytan, IKE CNP

## 2018-05-10 ASSESSMENT — PATIENT HEALTH QUESTIONNAIRE - PHQ9: SUM OF ALL RESPONSES TO PHQ QUESTIONS 1-9: 0

## 2018-05-10 ASSESSMENT — ANXIETY QUESTIONNAIRES: GAD7 TOTAL SCORE: 0

## 2018-05-11 LAB
COPATH REPORT: NORMAL
PAP: NORMAL

## 2018-05-15 LAB
FINAL DIAGNOSIS: NORMAL
HPV HR 12 DNA CVX QL NAA+PROBE: NEGATIVE
HPV16 DNA SPEC QL NAA+PROBE: NEGATIVE
HPV18 DNA SPEC QL NAA+PROBE: NEGATIVE
SPECIMEN DESCRIPTION: NORMAL
SPECIMEN SOURCE CVX/VAG CYTO: NORMAL

## 2018-10-27 ENCOUNTER — RADIANT APPOINTMENT (OUTPATIENT)
Dept: GENERAL RADIOLOGY | Facility: CLINIC | Age: 33
End: 2018-10-27
Attending: FAMILY MEDICINE
Payer: COMMERCIAL

## 2018-10-27 ENCOUNTER — OFFICE VISIT (OUTPATIENT)
Dept: URGENT CARE | Facility: URGENT CARE | Age: 33
End: 2018-10-27
Payer: COMMERCIAL

## 2018-10-27 ENCOUNTER — NURSE TRIAGE (OUTPATIENT)
Dept: NURSING | Facility: CLINIC | Age: 33
End: 2018-10-27

## 2018-10-27 VITALS
HEART RATE: 68 BPM | WEIGHT: 190 LBS | DIASTOLIC BLOOD PRESSURE: 76 MMHG | TEMPERATURE: 97.9 F | SYSTOLIC BLOOD PRESSURE: 105 MMHG | BODY MASS INDEX: 31.62 KG/M2 | OXYGEN SATURATION: 100 %

## 2018-10-27 DIAGNOSIS — R04.2 HEMOPTYSIS: Primary | ICD-10-CM

## 2018-10-27 PROCEDURE — 71046 X-RAY EXAM CHEST 2 VIEWS: CPT

## 2018-10-27 PROCEDURE — 99213 OFFICE O/P EST LOW 20 MIN: CPT | Performed by: FAMILY MEDICINE

## 2018-10-27 NOTE — TELEPHONE ENCOUNTER
" calling for wife \"Every morning she coughs up pinkish sputum\".  Caller puts wife on phone for triage, wife said sputum looks dark brown with bloody streaks.  She denies any pink, frothy sputum.    Gave callers UC locations and times of operation.     Reason for Disposition    Coughing up rubi-colored sputum    Additional Information    Negative: Severe difficulty breathing (e.g., struggling for each breath, speaks in single words)    Negative: [1] Chest pain AND [2] difficulty breathing    Negative: Bluish lips, tongue, or face now    Negative: Passed out (i.e., lost consciousness, collapsed and was not responding)    Negative: Shock suspected (e.g., cold/pale/clammy skin, too weak to stand, low BP, rapid pulse)    Negative: Difficult to awaken or acting confused  (e.g., disoriented, slurred speech)    Negative: Recent chest injury (i.e., past 24 hours)    Negative: [1] Coughed up blood AND [2] large amount (example: \"a cup of blood\")    Negative: Sounds like a life-threatening emergency to the triager    Negative: Difficulty breathing    Negative: Chest pain    Negative: Unclear to triager if the patient is coughing up blood or vomiting blood    Negative: History of prior \"blood clot\" in leg or lungs (i.e., deep vein thrombosis, pulmonary embolism)    Negative: History of inherited increased risk of blood clots (e.g., Factor 5 Leiden, Anti-thrombin 3, Protein C or Protein S deficiency, Prothrombin mutation)    Negative: Pregnant or pregnant in past month    Negative: Hip or leg fracture in past 2 months (e.g., or had cast on leg or ankle)    Negative: Prolonged travel within the last month  (e.g., long bus trip or plane trip)    Negative: Bedridden (e.g., nursing home patient, CVA, chronic illness, recovering from surgery)    Negative: Patient sounds very sick or weak to the triager    Negative: [1] Coughed up blood AND [2] > 1 tablespoon (15 ml) (Exception: blood-tinged sputum)    Negative: Fever > 103 F " (39.4 C)    Negative: [1] Fever > 101 F (38.3 C) AND [2] age > 60    Negative: [1] Fever > 100.5 F (38.1 C) AND [2] diabetes mellitus or weak immune system (e.g., HIV positive, cancer chemo, splenectomy, organ transplant, chronic steroids)    Negative: [1] Has underlying lung disease (e.g., COPD, chronic bronchitis or emphysema) AND    [2] sputum has turned yellow or green in color    Protocols used: COUGHING UP BLOOD-ADULT-    Argenis Morejon RN  [facility]

## 2018-10-27 NOTE — MR AVS SNAPSHOT
After Visit Summary   10/27/2018    Johnna Maher    MRN: [id number]           Patient Information     Date Of Birth          1985        Visit Information        Provider Department      10/27/2018 10:45 AM Yang Aviles MD [facility]        Today's Diagnoses     Hemoptysis    -  1       Follow-ups after your visit        Additional Services     OTOLARYNGOLOGY REFERRAL       Your provider has referred you to: [facility] (Otolaryngology) - [place] [phone]  [email or web address]  [facility] [phone]   [email or web address]    Please be aware that coverage of these services is subject to the terms and limitations of your health insurance plan.  Call member services at your health plan with any benefit or coverage questions.      Please bring the following with you to your appointment:    (1) Any X-Rays, CTs or MRIs which have been performed.  Contact the facility where they were done to arrange for  prior to your scheduled appointment.   (2) List of current medications  (3) This referral request   (4) Any documents/labs given to you for this referral                  Who to contact     If you have questions or need follow up information about today's clinic visit or your schedule please contact [facility] directly at [phone].  Normal or non-critical lab and imaging results will be communicated to you by MyChart, letter or phone within 4 business days after the clinic has received the results. If you do not hear from us within 7 days, please contact the clinic through MyChart or phone. If you have a critical or abnormal lab result, we will notify you by phone as soon as possible.  Submit refill requests through [employer] or call your pharmacy and they will  forward the refill request to us. Please allow 3 business days for your refill to be completed.          Additional Information About Your Visit        [facility] Information     [employer] gives you secure access to your electronic health record. If you see a primary care provider, you can also send messages to your care team and make appointments. If you have questions, please call your primary care clinic.  If you do not have a primary care provider, please call [phone] and they will assist you.        Care EveryWhere ID     This is your Care EveryWhere ID. This could be used by other organizations to access your [place] medical records  [id number]        Your Vitals Were     Pulse Temperature Pulse Oximetry BMI (Body Mass Index)          68 97.9  F (36.6  C) (Tympanic) 100% 31.62 kg/m2         Blood Pressure from Last 3 Encounters:   10/27/18 105/76   05/09/18 100/58   05/05/18 116/82    Weight from Last 3 Encounters:   10/27/18 190 lb (86.2 kg)   05/09/18 197 lb (89.4 kg)   05/05/18 197 lb (89.4 kg)              We Performed the Following     OTOLARYNGOLOGY REFERRAL     XR Chest 2 Views        Primary Care Provider Office Phone # Fax #    [facility] [place] [phone]       [place] [address]  [facility] [address]  [facility] [address]        Equal Access to Services     ISAAC SUN : Hadii florian ku hadasho Sodeaali, waaxda luqadaha, qaybta kaalmada izabel, arya holguin. So [facility] [phone].    ATENCIÓN: Si habla español, tiene a arce disposición servicios gratuitos de asistencia lingüística. Guevara al [phone].    We comply with applicable federal civil rights laws and [place] laws. We do not discriminate on the basis of race, color, national origin, age, disability, sex, sexual orientation, or gender identity.            Thank you!     Thank you for choosing [facility]  for your care. Our goal  is always to provide you with excellent care. Hearing back from our patients is one way we can continue to improve our services. Please take a few minutes to complete the written survey that you may receive in the mail after your visit with us. Thank you!             Your Updated Medication List - Protect others around you: Learn how to safely use, store and throw away your medicines at www.disposemymeds.org.      Notice  As of 10/27/2018 11:39 AM    You have not been prescribed any medications.

## 2018-10-27 NOTE — PROGRESS NOTES
Subjective: For the last 4 or 5 months, patient has noticed that every morning she gets some mucus up that is brown and sometimes bloody, does not taste bad, does not really bring anything up the rest of the day, has a little congestion lately from the change in weather but this is happening all through the summer.  She has not lost weight, appetite is okay, no unusual cough.  Her throat is not sore.  She moved here from [place] about 15 months ago.  Her  and 2 children are healthy.  No chance of pregnancy.    Objective: ENT exam is entirely normal.  Neck is normal.  Lungs are clear.  Heart is regular without murmurs.  Abdomen benign.  Chest x-ray was done and appears to show some old granulomas on the left, but I want to see what radiology thinks..  This certainly would rule out active TB if they think the granulomas are old..    Assessment and plan: Long lasting hemoptysis and I suspect the problem is something ENT could probably sort out.  Referral is done.  If the radiologist read the chest x-ray is suspicious I would order a CAT scan to further evaluate

## 2018-10-29 DIAGNOSIS — R04.2 HEMOPTYSIS: Primary | ICD-10-CM

## 2018-11-02 ENCOUNTER — TRANSFERRED RECORDS (OUTPATIENT)
Dept: HEALTH INFORMATION MANAGEMENT | Facility: CLINIC | Age: 33
End: 2018-11-02

## 2018-12-27 NOTE — TELEPHONE ENCOUNTER
FUTURE VISIT INFORMATION      FUTURE VISIT INFORMATION:    Date: 12.31.18     Time: 7:00 AM    Location: [facility]  REFERRAL INFORMATION:    Referring provider:      Referring providers clinic:      Reason for visit/diagnosis  Hemoptsysis    RECORDS REQUESTED FROM:       Clinic name Comments Records Status Imaging Status   Ear, nose and throat specialty care  [facility]                                      RECORDS RECEIVED FROM:    DATE RECEIVED: 12.31.18   NOTES STATUS DETAILS   OFFICE NOTE from referring provider N/A    OFFICE NOTE from other specialist Received 11.2.18 ENT specialty care   DISCHARGE SUMMARY from hospital N/A    DISCHARGE REPORT from the ER [facility] urgent [facility]   OPERATIVE REPORT N/A    MEDICATION LIST Internal    IMAGING  (NEED IMAGES AND REPORTS)     CT SCAN N/A    CHEST XRAY (CXR) Internal 10.27.18   TESTS     PULMONARY FUNCTION TESTING (PFT) In process Scheduled for 12.31.18   FLOW LOOP VOLUME (FVL) In process Scheduled for 12.31.18   CYSTIC FIBROSIS     CF SPUTUM CULTURE N/A

## 2018-12-31 ENCOUNTER — OFFICE VISIT (OUTPATIENT)
Dept: PULMONOLOGY | Facility: CLINIC | Age: 33
End: 2018-12-31
Attending: INTERNAL MEDICINE
Payer: COMMERCIAL

## 2018-12-31 ENCOUNTER — PRE VISIT (OUTPATIENT)
Dept: PULMONOLOGY | Facility: CLINIC | Age: 33
End: 2018-12-31

## 2018-12-31 ENCOUNTER — TELEPHONE (OUTPATIENT)
Dept: PULMONOLOGY | Facility: CLINIC | Age: 33
End: 2018-12-31

## 2018-12-31 ENCOUNTER — ANCILLARY PROCEDURE (OUTPATIENT)
Dept: CT IMAGING | Facility: CLINIC | Age: 33
End: 2018-12-31
Attending: INTERNAL MEDICINE
Payer: COMMERCIAL

## 2018-12-31 VITALS
BODY MASS INDEX: 29.98 KG/M2 | DIASTOLIC BLOOD PRESSURE: 78 MMHG | RESPIRATION RATE: 16 BRPM | HEIGHT: 67 IN | HEART RATE: 71 BPM | WEIGHT: 191 LBS | SYSTOLIC BLOOD PRESSURE: 109 MMHG | OXYGEN SATURATION: 99 %

## 2018-12-31 DIAGNOSIS — R04.2 HEMOPTYSIS: ICD-10-CM

## 2018-12-31 DIAGNOSIS — R04.2 HEMOPTYSIS: Primary | ICD-10-CM

## 2018-12-31 DIAGNOSIS — R91.1 LUNG NODULE: ICD-10-CM

## 2018-12-31 PROCEDURE — G0463 HOSPITAL OUTPT CLINIC VISIT: HCPCS | Mod: ZF

## 2018-12-31 PROCEDURE — 31624 DX BRONCHOSCOPE/LAVAGE: CPT | Mod: ZF | Performed by: INTERNAL MEDICINE

## 2018-12-31 SDOH — HEALTH STABILITY: MENTAL HEALTH: HOW OFTEN DO YOU HAVE A DRINK CONTAINING ALCOHOL?: NEVER

## 2018-12-31 ASSESSMENT — MIFFLIN-ST. JEOR: SCORE: 1614

## 2018-12-31 ASSESSMENT — PAIN SCALES - GENERAL: PAINLEVEL: NO PAIN (0)

## 2018-12-31 NOTE — Clinical Note
Please call pt with CT results -- normal except for infectious/inflammatory looking nodule in RML.  Recommend bronch w/ BAL and airway exam for hemoptysis (already discussed w/ patient and ).  Should be able to add on for Weds 1/2 with bronch staff of the day.  Let me know if questions.  Bronch is ordered.  Thanks.  Cindy

## 2018-12-31 NOTE — TELEPHONE ENCOUNTER
"Contacted patient to schedule Bronchoscopy per Dr. Walker. Patient will come in on 1/2/19- 10:30 arrival time (11:30 start) for Bronchoscopy with lavage. I prepped the patient and also discussed the exam information with her  (per the patients request). Patient is aware that she will need a . Per patients request, I emailed the bronchoscopy prep sheet along with the appointment information. Patient and  expressed understanding of prep instructions and will arrive at the endoscopy suite 1/2/19 at 10:30 am. They have been instructed to call in with further questions.     \"Here is the information for your Bronchoscopy on Wednesday 1/2.      Please arrive for your exam at 10:30 am.     Location:     [place] [address]   (Check in on 1st floor- Endoscopy suite)    Reminders:  -You will need a  on the day of the exam (you will be lightly sedated)  -Stop taking over the counter pain-killers: Advil, Aleve on the day before your exam (Tylenol is ok)  -Stop food and drink (including water) 6 hours before exam.     Attached is a document with everything you need to know about your upcoming Bronchoscopy.    Please let me know if you have any questions!\"    Also shared results of Chest CT per Dr. Walker's request, \"Right middle lobe 9 mm groundglass nodule, which may be  inflammatory/infectious in nature, and is likely benign given young  age. A few additional tiny indeterminate solid nodules. Fleischner  Society follow-up guidelines do not apply due to young age.  Recommend bronchoscopy with airway exam and RML BAL to eval for hemoptysis.  Repeat CT in 6 months to ensure no change in visualized nodule.\" Patient understands results and agrees to plan.  "

## 2018-12-31 NOTE — LETTER
12/31/2018       RE: Johnna Maher  [address]  [facility] [address]     Dear Colleague,    Thank you for referring your patient, Johnna Maher, to the [facility] at [facility]. Please see a copy of my visit note below.    Pulmonary Clinic New Patient Consult  Reason for Consult: Hemoptysis  History of Present Illness    Ms. Maher is a 33 yof with no significant past medical history who presents to pulmonary clinic for further evaluation of hemoptysis.  She is accompanied to clinic today by her .  She describes her hemoptysis is blood-tinged sputum which only occurs in the morning.  It feels as if the phlegm is coming from the back of her throat. The sputum is mostly brownish tinged.  She denies ever having kenrick hemoptysis.  This has previously occurred on a few occasions.  It occurred on her first winter here.  Then recurred over this last spring and summer stopped for a few months and is now going on again for the last few months.  She is never had large volume hemoptysis.  She denies any fever, cough during the day, shortness of breath at rest, or dyspnea with exertion's.  Her weight has been stable.  She denies any problems with recurrent pneumonias or other sinopulmonary infections.  She denies any history of sinus issues.  She denies any significant or recurrent epistaxis.  She has no history of asthma or other underlying lung diseases that she knows.  She denies any known exposure to tuberculosis.    She lives in [place] and has no pets at home.  She is currently a student.  She denies any known exposure to asbestos.  She further denies any significant exposure to birds, recent travel outside the area, or hot tub or Jacuzzi she uses on a regular basis.  She does believe that she has some pillows or comfort or stuff with down feathers at home.    She describes occasional heartburn.  She eats  dinner nightly between 10 and 11 PM and goes to bed very shortly thereafter.    She denies any family history of lung diseases.    Review of records suggests she was previously seen by her primary care provider for the same complaint in October of this year.  She had a chest x-ray on October 27 which was without any acute cardiopulmonary abnormalities.  She was referred to ENT.  She had normal visualized laryngoscopy with no obvious source of upper airway bleeding.  Bleeding from more obscure sources, such as small capillaries could not be definitively excluded.  Consideration of referral to GI and pulmonary was suggested at that time.    Review of Systems:  10 of 14 systems reviewed and are negative unless otherwise stated in HPI.    Past Medical History:   Diagnosis Date     History of ovarian cyst      IUD (intrauterine device) in place        History reviewed. No pertinent surgical history.    History reviewed. No pertinent family history.    Social History     Socioeconomic History     Marital status:      Spouse name: None     Number of children: None     Years of education: None     Highest education level: None   Social Needs     Financial resource strain: None     Food insecurity - worry: None     Food insecurity - inability: None     Transportation needs - medical: None     Transportation needs - non-medical: None   Occupational History     None   Tobacco Use     Smoking status: Never Smoker     Smokeless tobacco: Never Used   Substance and Sexual Activity     Alcohol use: No     Frequency: Never     Drug use: No     Sexual activity: Yes     Partners: Male     Birth control/protection: IUD     Comment: IUD from [place], inserted August 2017   Other Topics Concern     Parent/sibling w/ CABG, MI or angioplasty before 65F 55M? Not Asked   Social History Narrative     None       No Known Allergies    No current outpatient medications on file.      Physical Exam:  /78   Pulse 71   Resp 16   Ht  "1.702 m (5' 7\")   Wt 86.6 kg (191 lb)   SpO2 99%   BMI 29.91 kg/m     GENERAL: Well developed, well nourished, alert, and in no apparent distress.  HEENT: Normocephalic, atraumatic. PERRL, EOMI. Oral mucosa is moist. No perioral cyanosis.  NECK: supple, no masses, no thyromegaly.  RESP:  Normal respiratory effort.  CTAB.  No rales, wheezes, rhonchi.  No cyanosis or clubbing.  CV: Normal S1, S2, regular rhythm, normal rate. No murmur.  No LE edema.   ABDOMEN:  Soft, non-tender, non-distended.   SKIN: warm and dry. No rash.  NEURO: AAOx3.  Normal gait.  No focal neuro deficits.  PSYCH: mentation appears normal. and affect normal/bright    Results:  PFTs: Ratio 83%, FVC 84%, FEV1 83%, TLC 82%, RV 90%, DLCO 113%.  Testing did not meet ATS criteria for FVC or total lung capacity, despite this limitation testing reveals normal pulmonary mechanics with normal gas exchange.  There is no prior study available for comparison.  Imaging (personally reviewed in clinic today):  CXR 10/27: as described in HPI.      Assessment and Plan:   Johnna Maher is a 33 year old female with no significant past medical history who presents to pulmonary clinic today for further evaluation and management of hemoptysis.  Hemoptysis has been relatively long-standing, and recurs intermittently.  It is mostly characterized by scant amount of blood-tinged sputum which is brought up first thing in the morning and does not persist throughout the day.  The patient denies ever having any large volume or kenrick hemoptysis.  At this point, the next best step would be to obtain CT of the chest to look for more subtle abnormalities which could be causing hemoptysis that were missed on chest x-ray.  We will plan to obtain this in the near future and will follow up on the results with the patient and her  to discuss next steps.  Most likely she will need bronchoscopy with airway exam and BAL to exclude endobronchial lesions or other causes of " hemoptysis that could be of concern.  We will discuss the utility of this further pending results of her CT chest.  Based on the fact that the hemoptysis is very mild, and only occurs in the morning upon waking, I am suspicious that this could be possibly related to nocturnal acid reflux.  This is further supported by her history of intermittent dyspepsia as well as eating dinner and lying down for bed and very near proximity to each other.  If this is the case, lifestyle modifications aimed at reducing reflux +/- a trial of acid suppressing medications could be discussed.  Follow through on referral to GI, as suggested by ENT could also be entertained.  The above findings and plan were discussed with Ms. Maher and her . Questions and concerns were answered to theirsatisfaction.  she was provided with my contact information should new questions or concerns arise in the interim.  She is up to date on a seasonal influenza vaccine.    Cindy Walker MD  Pulmonary and Critical Care Medicine    The above note was dictated using voice recognition software and may include typographical errors. Please contact the author for any clarifications.                                      Pulmonary Clinic New Patient Consult  Reason for Consult: Hemoptysis  History of Present Illness    Ms. Maher is a 33 yof with no significant past medical history who presents to pulmonary clinic for further evaluation of hemoptysis.  She is accompanied to clinic today by her .  She describes her hemoptysis is blood-tinged sputum which only occurs in the morning.  It feels as if the phlegm is coming from the back of her throat. The sputum is mostly brownish tinged.  She denies ever having kenrick hemoptysis.  This has previously occurred on a few occasions.  It occurred on her first winter here.  Then recurred over this last spring and summer stopped for a few months and is now going on again for the last few months.  She  is never had large volume hemoptysis.  She denies any fever, cough during the day, shortness of breath at rest, or dyspnea with exertion's.  Her weight has been stable.  She denies any problems with recurrent pneumonias or other sinopulmonary infections.  She denies any history of sinus issues.  She denies any significant or recurrent epistaxis.  She has no history of asthma or other underlying lung diseases that she knows.  She denies any known exposure to tuberculosis.    She lives in [place] and has no pets at home.  She is currently a student.  She denies any known exposure to asbestos.  She further denies any significant exposure to birds, recent travel outside the area, or hot tub or Jacuzzi she uses on a regular basis.  She does believe that she has some pillows or comfort or stuff with down feathers at home.    She describes occasional heartburn.  She eats dinner nightly between 10 and 11 PM and goes to bed very shortly thereafter.    She denies any family history of lung diseases.    Review of records suggests she was previously seen by her primary care provider for the same complaint in October of this year.  She had a chest x-ray on October 27 which was without any acute cardiopulmonary abnormalities.  She was referred to ENT.  She had normal visualized laryngoscopy with no obvious source of upper airway bleeding.  Bleeding from more obscure sources, such as small capillaries could not be definitively excluded.  Consideration of referral to GI and pulmonary was suggested at that time.    Review of Systems:  10 of 14 systems reviewed and are negative unless otherwise stated in HPI.    Past Medical History:   Diagnosis Date     History of ovarian cyst      IUD (intrauterine device) in place        History reviewed. No pertinent surgical history.    History reviewed. No pertinent family history.    Social History     Socioeconomic History     Marital status:      Spouse name: None     Number of  "children: None     Years of education: None     Highest education level: None   Social Needs     Financial resource strain: None     Food insecurity - worry: None     Food insecurity - inability: None     Transportation needs - medical: None     Transportation needs - non-medical: None   Occupational History     None   Tobacco Use     Smoking status: Never Smoker     Smokeless tobacco: Never Used   Substance and Sexual Activity     Alcohol use: No     Frequency: Never     Drug use: No     Sexual activity: Yes     Partners: Male     Birth control/protection: IUD     Comment: IUD from [place], inserted August 2017   Other Topics Concern     Parent/sibling w/ CABG, MI or angioplasty before 65F 55M? Not Asked   Social History Narrative     None       No Known Allergies    No current outpatient medications on file.      Physical Exam:  /78   Pulse 71   Resp 16   Ht 1.702 m (5' 7\")   Wt 86.6 kg (191 lb)   SpO2 99%   BMI 29.91 kg/m     GENERAL: Well developed, well nourished, alert, and in no apparent distress.  HEENT: Normocephalic, atraumatic. PERRL, EOMI. Oral mucosa is moist. No perioral cyanosis.  NECK: supple, no masses, no thyromegaly.  RESP:  Normal respiratory effort.  CTAB.  No rales, wheezes, rhonchi.  No cyanosis or clubbing.  CV: Normal S1, S2, regular rhythm, normal rate. No murmur.  No LE edema.   ABDOMEN:  Soft, non-tender, non-distended.   SKIN: warm and dry. No rash.  NEURO: AAOx3.  Normal gait.  No focal neuro deficits.  PSYCH: mentation appears normal. and affect normal/bright    Results:  PFTs: Ratio 83%, FVC 84%, FEV1 83%, TLC 82%, RV 90%, DLCO 113%.  Testing did not meet ATS criteria for FVC or total lung capacity, despite this limitation testing reveals normal pulmonary mechanics with normal gas exchange.  There is no prior study available for comparison.  Imaging (personally reviewed in clinic today):  CXR 10/27: as described in HPI.      Assessment and Plan:   Johnna Maher is a " 33 year old female with no significant past medical history who presents to pulmonary clinic today for further evaluation and management of hemoptysis.  Hemoptysis has been relatively long-standing, and recurs intermittently.  It is mostly characterized by scant amount of blood-tinged sputum which is brought up first thing in the morning and does not persist throughout the day.  The patient denies ever having any large volume or kenrick hemoptysis.  At this point, the next best step would be to obtain CT of the chest to look for more subtle abnormalities which could be causing hemoptysis that were missed on chest x-ray.  We will plan to obtain this in the near future and will follow up on the results with the patient and her  to discuss next steps.  Most likely she will need bronchoscopy with airway exam and BAL to exclude endobronchial lesions or other causes of hemoptysis that could be of concern.  We will discuss the utility of this further pending results of her CT chest.  Based on the fact that the hemoptysis is very mild, and only occurs in the morning upon waking, I am suspicious that this could be possibly related to nocturnal acid reflux.  This is further supported by her history of intermittent dyspepsia as well as eating dinner and lying down for bed and very near proximity to each other.  If this is the case, lifestyle modifications aimed at reducing reflux +/- a trial of acid suppressing medications could be discussed.  Follow through on referral to GI, as suggested by ENT could also be entertained.  The above findings and plan were discussed with Ms. Maher and her . Questions and concerns were answered to theirsatisfaction.  she was provided with my contact information should new questions or concerns arise in the interim.  She is up to date on a seasonal influenza vaccine.    Cindy Walker MD  Pulmonary and Critical Care Medicine    The above note was dictated using voice  recognition software and may include typographical errors. Please contact the author for any clarifications.  ### Addendum:  CT Results:  1. Right middle lobe 9 mm groundglass nodule, which may be  inflammatory/infectious in nature, and is likely benign given young  age. A few additional tiny indeterminate solid nodules. Fleischner  Society follow-up guidelines do not apply due to young age.  Recommend bronchoscopy with airway exam and RML BAL to eval for hemoptysis.  Repeat CT in 6 months to ensure no change in visualized nodule.                                    Again, thank you for allowing me to participate in the care of your patient.      Sincerely,    Yanna Walker MD

## 2018-12-31 NOTE — PROGRESS NOTES
Pulmonary Clinic New Patient Consult  Reason for Consult: Hemoptysis  History of Present Illness    Ms. Maher is a 33 yof with no significant past medical history who presents to pulmonary clinic for further evaluation of hemoptysis.  She is accompanied to clinic today by her .  She describes her hemoptysis is blood-tinged sputum which only occurs in the morning.  It feels as if the phlegm is coming from the back of her throat. The sputum is mostly brownish tinged.  She denies ever having kenrick hemoptysis.  This has previously occurred on a few occasions.  It occurred on her first winter here.  Then recurred over this last spring and summer stopped for a few months and is now going on again for the last few months.  She is never had large volume hemoptysis.  She denies any fever, cough during the day, shortness of breath at rest, or dyspnea with exertion's.  Her weight has been stable.  She denies any problems with recurrent pneumonias or other sinopulmonary infections.  She denies any history of sinus issues.  She denies any significant or recurrent epistaxis.  She has no history of asthma or other underlying lung diseases that she knows.  She denies any known exposure to tuberculosis.    She lives in [place] and has no pets at home.  She is currently a student.  She denies any known exposure to asbestos.  She further denies any significant exposure to birds, recent travel outside the area, or hot tub or Jacuzzi she uses on a regular basis.  She does believe that she has some pillows or comfort or stuff with down feathers at home.    She describes occasional heartburn.  She eats dinner nightly between 10 and 11 PM and goes to bed very shortly thereafter.    She denies any family history of lung diseases.    Review of records suggests she was previously seen by her primary care provider for the same complaint in October of this year.  She had a chest x-ray on October 27 which was without any acute  "cardiopulmonary abnormalities.  She was referred to ENT.  She had normal visualized laryngoscopy with no obvious source of upper airway bleeding.  Bleeding from more obscure sources, such as small capillaries could not be definitively excluded.  Consideration of referral to GI and pulmonary was suggested at that time.    Review of Systems:  10 of 14 systems reviewed and are negative unless otherwise stated in HPI.    Past Medical History:   Diagnosis Date     History of ovarian cyst      IUD (intrauterine device) in place        History reviewed. No pertinent surgical history.    History reviewed. No pertinent family history.    Social History     Socioeconomic History     Marital status:      Spouse name: None     Number of children: None     Years of education: None     Highest education level: None   Social Needs     Financial resource strain: None     Food insecurity - worry: None     Food insecurity - inability: None     Transportation needs - medical: None     Transportation needs - non-medical: None   Occupational History     None   Tobacco Use     Smoking status: Never Smoker     Smokeless tobacco: Never Used   Substance and Sexual Activity     Alcohol use: No     Frequency: Never     Drug use: No     Sexual activity: Yes     Partners: Male     Birth control/protection: IUD     Comment: IUD from [place], inserted August 2017   Other Topics Concern     Parent/sibling w/ CABG, MI or angioplasty before 65F 55M? Not Asked   Social History Narrative     None       No Known Allergies    No current outpatient medications on file.      Physical Exam:  /78   Pulse 71   Resp 16   Ht 1.702 m (5' 7\")   Wt 86.6 kg (191 lb)   SpO2 99%   BMI 29.91 kg/m    GENERAL: Well developed, well nourished, alert, and in no apparent distress.  HEENT: Normocephalic, atraumatic. PERRL, EOMI. Oral mucosa is moist. No perioral cyanosis.  NECK: supple, no masses, no thyromegaly.  RESP:  Normal respiratory effort.  CTAB.  " No rales, wheezes, rhonchi.  No cyanosis or clubbing.  CV: Normal S1, S2, regular rhythm, normal rate. No murmur.  No LE edema.   ABDOMEN:  Soft, non-tender, non-distended.   SKIN: warm and dry. No rash.  NEURO: AAOx3.  Normal gait.  No focal neuro deficits.  PSYCH: mentation appears normal. and affect normal/bright    Results:  PFTs: Ratio 83%, FVC 84%, FEV1 83%, TLC 82%, RV 90%, DLCO 113%.  Testing did not meet ATS criteria for FVC or total lung capacity, despite this limitation testing reveals normal pulmonary mechanics with normal gas exchange.  There is no prior study available for comparison.  Imaging (personally reviewed in clinic today):  CXR 10/27: as described in HPI.      Assessment and Plan:   Johnna Maher is a 33 year old female with no significant past medical history who presents to pulmonary clinic today for further evaluation and management of hemoptysis.  Hemoptysis has been relatively long-standing, and recurs intermittently.  It is mostly characterized by scant amount of blood-tinged sputum which is brought up first thing in the morning and does not persist throughout the day.  The patient denies ever having any large volume or kenrick hemoptysis.  At this point, the next best step would be to obtain CT of the chest to look for more subtle abnormalities which could be causing hemoptysis that were missed on chest x-ray.  We will plan to obtain this in the near future and will follow up on the results with the patient and her  to discuss next steps.  Most likely she will need bronchoscopy with airway exam and BAL to exclude endobronchial lesions or other causes of hemoptysis that could be of concern.  We will discuss the utility of this further pending results of her CT chest.  Based on the fact that the hemoptysis is very mild, and only occurs in the morning upon waking, I am suspicious that this could be possibly related to nocturnal acid reflux.  This is further supported by her  history of intermittent dyspepsia as well as eating dinner and lying down for bed and very near proximity to each other.  If this is the case, lifestyle modifications aimed at reducing reflux +/- a trial of acid suppressing medications could be discussed.  Follow through on referral to GI, as suggested by ENT could also be entertained.  The above findings and plan were discussed with Ms. Maher and her . Questions and concerns were answered to theirsatisfaction.  she was provided with my contact information should new questions or concerns arise in the interim.  She is up to date on a seasonal influenza vaccine.    Cindy Walker MD  Pulmonary and Critical Care Medicine    The above note was dictated using voice recognition software and may include typographical errors. Please contact the author for any clarifications.  ### Addendum:  CT Results:  1. Right middle lobe 9 mm groundglass nodule, which may be  inflammatory/infectious in nature, and is likely benign given young  age. A few additional tiny indeterminate solid nodules. Fleischner  Society follow-up guidelines do not apply due to young age.  Recommend bronchoscopy with airway exam and RML BAL to eval for hemoptysis.  Repeat CT in 6 months to ensure no change in visualized nodule.

## 2018-12-31 NOTE — LETTER
Date:January 2, 2019      Patient was self referred, no letter generated. Do not send.        [facility] Information

## 2019-01-01 ENCOUNTER — NURSE TRIAGE (OUTPATIENT)
Dept: NURSING | Facility: CLINIC | Age: 34
End: 2019-01-01

## 2019-01-01 NOTE — TELEPHONE ENCOUNTER
Spouse calling; patient present.  Requesting detailed explanation of CT results of 12/31/18.  FNA explained that nurse could read results, but detailed explanation would have to come from provider and offered to send message requesting a call back.  Spouse requested FNA read results, which were previously read by another provider.  Spouse states will contact clinic tomorrow.

## 2019-01-02 ENCOUNTER — HOSPITAL ENCOUNTER (OUTPATIENT)
Facility: CLINIC | Age: 34
Discharge: HOME | End: 2019-01-02
Attending: INTERNAL MEDICINE | Admitting: INTERNAL MEDICINE
Payer: COMMERCIAL

## 2019-01-02 VITALS
BODY MASS INDEX: 29.91 KG/M2 | HEART RATE: 81 BPM | DIASTOLIC BLOOD PRESSURE: 88 MMHG | SYSTOLIC BLOOD PRESSURE: 112 MMHG | RESPIRATION RATE: 16 BRPM | WEIGHT: 191 LBS | OXYGEN SATURATION: 99 %

## 2019-01-02 LAB
APPEARANCE FLD: CLEAR
BRONCHOSCOPY: NORMAL
CD19 CELLS NFR BRONCH: 2 %
CD3 CELLS NFR BRONCH: 88 %
CD3+CD4+ CELLS NFR BRONCH: 53 %
CD3+CD4+ CELLS/CD3+CD8+ CLL BRONCH: 1.47 %
CD3+CD8+ CELLS NFR BRONCH: 36 %
CD3-CD16+CD56+ CELLS NFR SPEC: 4 %
COLOR FLD: COLORLESS
GRAM STN SPEC: NORMAL
GRAM STN SPEC: NORMAL
IFC SPECIMEN: NORMAL
LYMPHOCYTES NFR FLD MANUAL: 14 %
MONOS+MACROS NFR FLD MANUAL: 86 %
RBC # FLD: NORMAL /UL
SPECIMEN SOURCE FLD: NORMAL
SPECIMEN SOURCE: NORMAL
T-CELL SUBSET INTERPRETATION: NORMAL
WBC # FLD AUTO: 148 /UL

## 2019-01-02 PROCEDURE — 86356 MONONUCLEAR CELL ANTIGEN: CPT | Performed by: INTERNAL MEDICINE

## 2019-01-02 PROCEDURE — 88312 SPECIAL STAINS GROUP 1: CPT | Performed by: INTERNAL MEDICINE

## 2019-01-02 PROCEDURE — 87102 FUNGUS ISOLATION CULTURE: CPT | Performed by: INTERNAL MEDICINE

## 2019-01-02 PROCEDURE — 87205 SMEAR GRAM STAIN: CPT | Performed by: INTERNAL MEDICINE

## 2019-01-02 PROCEDURE — G0500 MOD SEDAT ENDO SERVICE >5YRS: HCPCS | Performed by: INTERNAL MEDICINE

## 2019-01-02 PROCEDURE — 88108 CYTOPATH CONCENTRATE TECH: CPT | Performed by: INTERNAL MEDICINE

## 2019-01-02 PROCEDURE — 87015 SPECIMEN INFECT AGNT CONCNTJ: CPT | Performed by: INTERNAL MEDICINE

## 2019-01-02 PROCEDURE — 31624 DX BRONCHOSCOPE/LAVAGE: CPT | Performed by: INTERNAL MEDICINE

## 2019-01-02 PROCEDURE — 89051 BODY FLUID CELL COUNT: CPT | Performed by: INTERNAL MEDICINE

## 2019-01-02 PROCEDURE — 25000125 ZZHC RX 250: Performed by: INTERNAL MEDICINE

## 2019-01-02 PROCEDURE — 99152 MOD SED SAME PHYS/QHP 5/>YRS: CPT | Performed by: INTERNAL MEDICINE

## 2019-01-02 PROCEDURE — 25000128 H RX IP 250 OP 636: Performed by: INTERNAL MEDICINE

## 2019-01-02 PROCEDURE — 87206 SMEAR FLUORESCENT/ACID STAI: CPT | Performed by: INTERNAL MEDICINE

## 2019-01-02 PROCEDURE — 87633 RESP VIRUS 12-25 TARGETS: CPT | Performed by: INTERNAL MEDICINE

## 2019-01-02 PROCEDURE — 87116 MYCOBACTERIA CULTURE: CPT | Performed by: INTERNAL MEDICINE

## 2019-01-02 PROCEDURE — 87070 CULTURE OTHR SPECIMN AEROBIC: CPT | Performed by: INTERNAL MEDICINE

## 2019-01-02 RX ORDER — FENTANYL CITRATE 50 UG/ML
INJECTION, SOLUTION INTRAMUSCULAR; INTRAVENOUS PRN
Status: DISCONTINUED | OUTPATIENT
Start: 2019-01-02 | End: 2019-01-02 | Stop reason: HOSPADM

## 2019-01-02 RX ORDER — ACETAMINOPHEN 500 MG
500 TABLET ORAL PRN
COMMUNITY
End: 2020-11-30

## 2019-01-02 RX ORDER — LIDOCAINE HYDROCHLORIDE 40 MG/ML
INJECTION, SOLUTION RETROBULBAR PRN
Status: DISCONTINUED | OUTPATIENT
Start: 2019-01-02 | End: 2019-01-02 | Stop reason: HOSPADM

## 2019-01-02 NOTE — OR NURSING
Procedure: Flexible bronchoscopy with BAL .  Sedation: Conscious sedation.   Specimens: See procedure documentation for details.  BAL:  Site: Rt middle lobe.  80 ml 0.9 % NS irrigation in, 40 ml returned  O2: 2 L/min via NC throughout procedure. 2 L/min upon transport to Endoscopy post-procedure recovery.  Note: Pt tolerated procedure well.   Transport: Pt transferred to Endo Post-procedure via cart. Siderails elevated dain. Accompanied by RN.  Report:  Bedside report given upon completion of transport to post-procedure room.    Additional Notes:  Dr. Camacho invited patient's  to be at bedside throughout BAL. Escorted to room by Endo     Rachel Heard RN  [facility]

## 2019-01-02 NOTE — DISCHARGE INSTRUCTIONS
Discharge Instructions after Bronchoscopy    Activity  _X__ You had medicine to relax and for pain. You may feel dizzy or sleepy.  For 24 hours:    Do not drive or use heavy equipment.    Do not make important decisions.    Do not drink any alcohol.    Diet  __X_ When you can swallow easily, you may go back to your regular diet, medicines  and light exercise.    Follow-up  _X__ We took small tissue or fluid samples to study. We will call you with the results in about 10 business days.    Call right away if you have:    Unusual chest pain    Temperature above 100.6  F (37.5  C)    Coughing that does not stop.    If you have severe pain, bleeding, or shortness of breath, go to an emergency room.    If you have questions, call:  Monday to Friday, 7 a.m. to 4:30 p.m.  Endoscopy: [phone] (We may have to call you back)    After hours:  Hospital: [phone] (Ask for the pulmonary fellow on call)

## 2019-01-03 LAB
CMV DNA SPEC NAA+PROBE-ACNC: NORMAL [IU]/ML
CMV DNA SPEC NAA+PROBE-LOG#: NORMAL {LOG_IU}/ML
FLUAV H1 2009 PAND RNA SPEC QL NAA+PROBE: NEGATIVE
FLUAV H1 RNA SPEC QL NAA+PROBE: NEGATIVE
FLUAV H3 RNA SPEC QL NAA+PROBE: NEGATIVE
FLUAV RNA SPEC QL NAA+PROBE: NEGATIVE
FLUBV RNA SPEC QL NAA+PROBE: NEGATIVE
HADV DNA SPEC QL NAA+PROBE: NEGATIVE
HADV DNA SPEC QL NAA+PROBE: NEGATIVE
HMPV RNA SPEC QL NAA+PROBE: NEGATIVE
HPIV1 RNA SPEC QL NAA+PROBE: NEGATIVE
HPIV2 RNA SPEC QL NAA+PROBE: NEGATIVE
HPIV3 RNA SPEC QL NAA+PROBE: NEGATIVE
MICROBIOLOGIST REVIEW: NORMAL
RHINOVIRUS RNA SPEC QL NAA+PROBE: NEGATIVE
RSV RNA SPEC QL NAA+PROBE: NEGATIVE
RSV RNA SPEC QL NAA+PROBE: NEGATIVE
SPECIMEN SOURCE: NORMAL
SPECIMEN SOURCE: NORMAL

## 2019-01-04 LAB
ACID FAST STN SPEC QL: NORMAL
BACTERIA SPEC CULT: NORMAL
COPATH REPORT: NORMAL
SPECIMEN SOURCE: NORMAL
SPECIMEN SOURCE: NORMAL

## 2019-01-07 ENCOUNTER — TELEPHONE (OUTPATIENT)
Dept: PULMONOLOGY | Facility: CLINIC | Age: 34
End: 2019-01-07

## 2019-01-07 DIAGNOSIS — R04.2 HEMOPTYSIS: Primary | ICD-10-CM

## 2019-01-08 ENCOUNTER — NURSE TRIAGE (OUTPATIENT)
Dept: NURSING | Facility: CLINIC | Age: 34
End: 2019-01-08

## 2019-01-08 NOTE — TELEPHONE ENCOUNTER
Brief Pulmonary Update:    Underwent bronch on 1/2.  No overt source or bleeding identified.  Bronchial mucosa was friable appearing throughout the tracheobronchial tree and appeared somewhat vascularized per bronch report.  RML lavage done with bacterial, fungal, and viral cultures negative or NGTD. Cell count was bland with monocyte/macrophage predominance and cytology was negative for malignancy or other obvious abnormality.    No obvious pulmonary source of hemoptysis on CT or visualized airway exam during bronchoscopy.  At this point would recommend TTE to evaluate for possible pulmonary hypertension as cause of hemoptysis given hypervascularity of airways.  Lifestyle modifications aimed at reducing known acid reflux are also advised as recurrent nocturnal aspiration could cause first a.m. Blood tinged sputum as reported.      Recommend f/u in our clinic in 6 months with repeat CT for pulmonary nodule seen on CT scan.  She should let us know if hemoptysis increases in volume or frequency in the interim.  Large volume hemoptysis should prompt more urgent, ED evaluation.        Cindy Walker MD  Pulmonary and Critical Care Medicine

## 2019-01-08 NOTE — TELEPHONE ENCOUNTER
Rian, patient's  calling requesting lab results. Reviewed Respiratory Virus Panel and Bronchial Culture results with patient. States there was a message sent to [employer] by patient's care team and was unable to retreive the message. RN unable to locate any message provider sent to patient. Advised to call clinic in the morning for further assistance. Caller verbalized understanding. Denies further questions.      Puneet Melendrez RN  [facility]     Reason for Disposition    [1] Caller requesting NON-URGENT health information AND [2] PCP's office is the best resource    Protocols used: INFORMATION ONLY CALL-ADULT-

## 2019-01-30 LAB
BACTERIA SPEC CULT: NORMAL
FUNGUS SPEC CULT: NORMAL
SPECIMEN SOURCE: NORMAL
SPECIMEN SOURCE: NORMAL

## 2019-02-28 LAB
MYCOBACTERIUM SPEC CULT: NORMAL
MYCOBACTERIUM SPEC CULT: NORMAL
SPECIMEN SOURCE: NORMAL

## 2019-05-06 ENCOUNTER — DOCUMENTATION ONLY (OUTPATIENT)
Dept: CARE COORDINATION | Facility: CLINIC | Age: 34
End: 2019-05-06

## 2019-06-20 ENCOUNTER — ANCILLARY PROCEDURE (OUTPATIENT)
Dept: CT IMAGING | Facility: CLINIC | Age: 34
End: 2019-06-20
Payer: COMMERCIAL

## 2019-06-20 ENCOUNTER — OFFICE VISIT (OUTPATIENT)
Dept: PULMONOLOGY | Facility: CLINIC | Age: 34
End: 2019-06-20
Attending: INTERNAL MEDICINE
Payer: COMMERCIAL

## 2019-06-20 VITALS
WEIGHT: 191.8 LBS | RESPIRATION RATE: 16 BRPM | BODY MASS INDEX: 30.04 KG/M2 | OXYGEN SATURATION: 100 % | SYSTOLIC BLOOD PRESSURE: 113 MMHG | HEART RATE: 86 BPM | DIASTOLIC BLOOD PRESSURE: 78 MMHG

## 2019-06-20 DIAGNOSIS — R91.1 LUNG NODULE: ICD-10-CM

## 2019-06-20 DIAGNOSIS — R93.89 ABNORMAL CHEST CT: ICD-10-CM

## 2019-06-20 DIAGNOSIS — R04.2 HEMOPTYSIS: Primary | ICD-10-CM

## 2019-06-20 DIAGNOSIS — R04.2 HEMOPTYSIS: ICD-10-CM

## 2019-06-20 PROCEDURE — G0463 HOSPITAL OUTPT CLINIC VISIT: HCPCS | Mod: ZF

## 2019-06-20 ASSESSMENT — PAIN SCALES - GENERAL: PAINLEVEL: NO PAIN (0)

## 2019-06-20 NOTE — LETTER
6/20/2019       RE: Johnna Maher  [address] [id number]     Dear Colleague,    Thank you for referring your patient, Johnna Maher, to the [facility]ENCE [facility] at [facility]. Please see a copy of my visit note below.    Pulmonary Clinic Return Visit  History of Present Illness    Ms. Maher is a 33 yof with no significant past medical history who presents to pulmonary clinic for follow up of hemoptysis.  Following her last visit he obtained a CT chest which was notable for a right middle lobe 9 mm groundglass nodule thought to be infectious or inflammatory in etiology.  She underwent bronchoscopy with airway exam and BAL in early January.  Visualized airway exam was normal.  BAL was monocyte predominant and all cultures and cytology returned negative.  A plan was made for continued observation and 6 months follow up with CT.    She returns to clinic today overall doing well.  She has not had any further episodes of hemoptysis for some time.  She denies any new or worsening shortness of breath at rest or exertion.  She denies any cough, sputum production, or episodes of pneumonia.  She has no other history of lung disease and is a never smoker.    She denies any family history of lung diseases.        Review of Systems:  10 of 14 systems reviewed and are negative unless otherwise stated in HPI.    Past Medical History:   Diagnosis Date     History of ovarian cyst      IUD (intrauterine device) in place        Past Surgical History:   Procedure Laterality Date     BRONCHOSCOPY (RIGID OR FLEXIBLE), DIAGNOSTIC N/A 1/2/2019    Procedure: Bronchoscopy with Lavage;  Surgeon: Rima Camacho MD;  Location: [facility]       No family history on file.    Social History     Socioeconomic History     Marital status:      Spouse name: None     Number of children: None     Years of education: None     Highest education level:  None   Social Needs     Financial resource strain: None     Food insecurity - worry: None     Food insecurity - inability: None     Transportation needs - medical: None     Transportation needs - non-medical: None   Occupational History     None   Tobacco Use     Smoking status: Never Smoker     Smokeless tobacco: Never Used   Substance and Sexual Activity     Alcohol use: No     Frequency: Never     Drug use: No     Sexual activity: Yes     Partners: Male     Birth control/protection: IUD     Comment: IUD from [place], inserted August 2017   Other Topics Concern     Parent/sibling w/ CABG, MI or angioplasty before 65F 55M? Not Asked   Social History Narrative     None       No Known Allergies      Current Outpatient Medications:      acetaminophen (TYLENOL) 500 MG tablet, Take 500 mg by mouth as needed for mild pain, Disp: , Rfl:       Physical Exam:  /78 (BP Location: Right arm, Patient Position: Chair, Cuff Size: Adult Regular)   Pulse 86   Resp 16   Wt 87 kg (191 lb 12.8 oz)   SpO2 100%   BMI 30.04 kg/m     GENERAL: Well developed, well nourished, alert, and in no apparent distress.  HEENT: Normocephalic, atraumatic. PERRL, EOMI. Oral mucosa is moist. No perioral cyanosis.  NECK: supple, no masses, no thyromegaly.  RESP:  Normal respiratory effort.  CTAB.  No rales, wheezes, rhonchi.  No cyanosis or clubbing.  CV: Normal S1, S2, regular rhythm, normal rate. No murmur.  No LE edema.   ABDOMEN:  Soft, non-tender, non-distended.   SKIN: warm and dry. No rash.  NEURO: AAOx3.  Normal gait.  No focal neuro deficits.  PSYCH: mentation appears normal. and affect normal/bright    Results:  CT Chest from today pending formal read by radiology.    Assessment and Plan:   Johnna Maher is a 33 year old female with no significant past medical history who presents to pulmonary clinic today for follow up of hemoptysis which was previously described as a scant amount of blood tinged sputum early in the morning.   CT from December showed a 9 mm RML nodule which was thought to most likely be infectious or inflammatory.  Ms. Maher subsequently underwent bronchoscopy for further evaluation with a normal visualized airway exam and all cultures and cytology returning negative.  She returns to clinic today doing well and has been without further episodes of hemoptysis. We will await formal CT read from radiology and be in touch with her regarding results.  The above findings and plan were discussed with Ms. Maher and her . Questions and concerns were answered to theirsatisfaction.  she was provided with my contact information should new questions or concerns arise in the interim.  She is up to date on a seasonal influenza vaccine.    Cindy Walker MD  Pulmonary and Critical Care Medicine    The above note was dictated using voice recognition software and may include typographical errors. Please contact the author for any clarifications.  ### After Clinic Addendum:  CT Chest result: IMPRESSION: Previously seen right middle lobe nodule has resolved since 12/31/2018. No new nodules. No acute findings.  No further follow up in pulmonary clinic needed at this time.  Per her request, results were communicated with her spouse Rian who voiced understanding and agreement with plan.                                    Again, thank you for allowing me to participate in the care of your patient.      Sincerely,    Yanna Walker MD

## 2019-06-20 NOTE — LETTER
Date:June 21, 2019      Patient was self referred, no letter generated. Do not send.        [facility] Information

## 2019-06-20 NOTE — PROGRESS NOTES
Pulmonary Clinic Return Visit  History of Present Illness    Ms. Maher is a 33 yof with no significant past medical history who presents to pulmonary clinic for follow up of hemoptysis.  Following her last visit he obtained a CT chest which was notable for a right middle lobe 9 mm groundglass nodule thought to be infectious or inflammatory in etiology.  She underwent bronchoscopy with airway exam and BAL in early January.  Visualized airway exam was normal.  BAL was monocyte predominant and all cultures and cytology returned negative.  A plan was made for continued observation and 6 months follow up with CT.    She returns to clinic today overall doing well.  She has not had any further episodes of hemoptysis for some time.  She denies any new or worsening shortness of breath at rest or exertion.  She denies any cough, sputum production, or episodes of pneumonia.  She has no other history of lung disease and is a never smoker.    She denies any family history of lung diseases.        Review of Systems:  10 of 14 systems reviewed and are negative unless otherwise stated in HPI.    Past Medical History:   Diagnosis Date     History of ovarian cyst      IUD (intrauterine device) in place        Past Surgical History:   Procedure Laterality Date     BRONCHOSCOPY (RIGID OR FLEXIBLE), DIAGNOSTIC N/A 1/2/2019    Procedure: Bronchoscopy with Lavage;  Surgeon: Rima Camacho MD;  Location: [facility]       No family history on file.    Social History     Socioeconomic History     Marital status:      Spouse name: None     Number of children: None     Years of education: None     Highest education level: None   Social Needs     Financial resource strain: None     Food insecurity - worry: None     Food insecurity - inability: None     Transportation needs - medical: None     Transportation needs - non-medical: None   Occupational History     None   Tobacco Use     Smoking status: Never Smoker     Smokeless  tobacco: Never Used   Substance and Sexual Activity     Alcohol use: No     Frequency: Never     Drug use: No     Sexual activity: Yes     Partners: Male     Birth control/protection: IUD     Comment: IUD from [place], inserted August 2017   Other Topics Concern     Parent/sibling w/ CABG, MI or angioplasty before 65F 55M? Not Asked   Social History Narrative     None       No Known Allergies      Current Outpatient Medications:      acetaminophen (TYLENOL) 500 MG tablet, Take 500 mg by mouth as needed for mild pain, Disp: , Rfl:       Physical Exam:  /78 (BP Location: Right arm, Patient Position: Chair, Cuff Size: Adult Regular)   Pulse 86   Resp 16   Wt 87 kg (191 lb 12.8 oz)   SpO2 100%   BMI 30.04 kg/m    GENERAL: Well developed, well nourished, alert, and in no apparent distress.  HEENT: Normocephalic, atraumatic. PERRL, EOMI. Oral mucosa is moist. No perioral cyanosis.  NECK: supple, no masses, no thyromegaly.  RESP:  Normal respiratory effort.  CTAB.  No rales, wheezes, rhonchi.  No cyanosis or clubbing.  CV: Normal S1, S2, regular rhythm, normal rate. No murmur.  No LE edema.   ABDOMEN:  Soft, non-tender, non-distended.   SKIN: warm and dry. No rash.  NEURO: AAOx3.  Normal gait.  No focal neuro deficits.  PSYCH: mentation appears normal. and affect normal/bright    Results:  CT Chest from today pending formal read by radiology.    Assessment and Plan:   Johnna Maher is a 33 year old female with no significant past medical history who presents to pulmonary clinic today for follow up of hemoptysis which was previously described as a scant amount of blood tinged sputum early in the morning.  CT from December showed a 9 mm RML nodule which was thought to most likely be infectious or inflammatory.  Ms. Maher subsequently underwent bronchoscopy for further evaluation with a normal visualized airway exam and all cultures and cytology returning negative.  She returns to clinic today doing  well and has been without further episodes of hemoptysis. We will await formal CT read from radiology and be in touch with her regarding results.  The above findings and plan were discussed with Ms. Maher and her . Questions and concerns were answered to theirsatisfaction.  she was provided with my contact information should new questions or concerns arise in the interim.  She is up to date on a seasonal influenza vaccine.    Cindy Walker MD  Pulmonary and Critical Care Medicine    The above note was dictated using voice recognition software and may include typographical errors. Please contact the author for any clarifications.  ### After Clinic Addendum:  CT Chest result: IMPRESSION: Previously seen right middle lobe nodule has resolved since 12/31/2018. No new nodules. No acute findings.  No further follow up in pulmonary clinic needed at this time.  Per her request, results were communicated with her spouse Rian who voiced understanding and agreement with plan.

## 2019-08-24 ENCOUNTER — OFFICE VISIT (OUTPATIENT)
Dept: URGENT CARE | Facility: URGENT CARE | Age: 34
End: 2019-08-24
Payer: COMMERCIAL

## 2019-08-24 VITALS
TEMPERATURE: 98.3 F | DIASTOLIC BLOOD PRESSURE: 82 MMHG | HEART RATE: 83 BPM | BODY MASS INDEX: 30.54 KG/M2 | WEIGHT: 195 LBS | SYSTOLIC BLOOD PRESSURE: 115 MMHG

## 2019-08-24 DIAGNOSIS — N89.8 VAGINAL ITCHING: Primary | ICD-10-CM

## 2019-08-24 LAB
SPECIMEN SOURCE: ABNORMAL
WET PREP SPEC: ABNORMAL

## 2019-08-24 PROCEDURE — 87210 SMEAR WET MOUNT SALINE/INK: CPT | Performed by: FAMILY MEDICINE

## 2019-08-24 PROCEDURE — 99213 OFFICE O/P EST LOW 20 MIN: CPT | Performed by: FAMILY MEDICINE

## 2019-08-24 NOTE — PROGRESS NOTES
SUBJECTIVE:   34 year old female complains of none vaginal discharge for 2 weeks. But has noted vag itching. No urinary symptoms. Started wearing new underwear but no other exposures.     Denies abnormal vaginal bleeding or significant pelvic pain or  fever. No UTI symptoms. Denies history of known exposure to STD. Denies dyspareunia.    No LMP recorded (approximate).    OBJECTIVE:   She appears well, afebrile.  Abdomen: benign, soft, nontender, no masses.  Pelvic Exam: deferred.  Results for orders placed or performed in visit on 08/24/19   Wet prep   Result Value Ref Range    Specimen Description Vagina     Wet Prep Yeast seen  Many   (A)     Wet Prep No WBC's seen     Wet Prep No Trichomonas seen     Wet Prep No clue cells seen         ASSESSMENT:   monilia vaginitis    PLAN:   GC and chlamydia genprobe swabs sent to lab.  Treatment: OTC yeast cream such as Monistat or Gyne-Lotrimin  ROV prn if symptoms persist or worsen.

## 2020-03-11 ENCOUNTER — HEALTH MAINTENANCE LETTER (OUTPATIENT)
Age: 35
End: 2020-03-11

## 2020-07-03 ENCOUNTER — TELEPHONE (OUTPATIENT)
Dept: OBGYN | Facility: CLINIC | Age: 35
End: 2020-07-03

## 2020-07-03 ENCOUNTER — NURSE TRIAGE (OUTPATIENT)
Dept: NURSING | Facility: CLINIC | Age: 35
End: 2020-07-03

## 2020-07-03 ENCOUNTER — OFFICE VISIT (OUTPATIENT)
Dept: URGENT CARE | Facility: URGENT CARE | Age: 35
End: 2020-07-03
Payer: COMMERCIAL

## 2020-07-03 VITALS
SYSTOLIC BLOOD PRESSURE: 112 MMHG | TEMPERATURE: 98.2 F | HEART RATE: 70 BPM | DIASTOLIC BLOOD PRESSURE: 80 MMHG | RESPIRATION RATE: 14 BRPM

## 2020-07-03 DIAGNOSIS — R60.0 BILATERAL LEG EDEMA: Primary | ICD-10-CM

## 2020-07-03 LAB
ALBUMIN UR-MCNC: NEGATIVE MG/DL
APPEARANCE UR: CLEAR
BILIRUB UR QL STRIP: NEGATIVE
COLOR UR AUTO: YELLOW
D DIMER PPP FEU-MCNC: 0.3 UG/ML FEU (ref 0–0.5)
ERYTHROCYTE [DISTWIDTH] IN BLOOD BY AUTOMATED COUNT: 14.2 % (ref 10–15)
GLUCOSE UR STRIP-MCNC: NEGATIVE MG/DL
HCT VFR BLD AUTO: 33.8 % (ref 35–47)
HGB BLD-MCNC: 11.9 G/DL (ref 11.7–15.7)
HGB UR QL STRIP: ABNORMAL
KETONES UR STRIP-MCNC: NEGATIVE MG/DL
LEUKOCYTE ESTERASE UR QL STRIP: NEGATIVE
MCH RBC QN AUTO: 25.8 PG (ref 26.5–33)
MCHC RBC AUTO-ENTMCNC: 35.2 G/DL (ref 31.5–36.5)
MCV RBC AUTO: 73 FL (ref 78–100)
NITRATE UR QL: NEGATIVE
PH UR STRIP: 6 PH (ref 5–7)
PLATELET # BLD AUTO: 224 10E9/L (ref 150–450)
RBC # BLD AUTO: 4.61 10E12/L (ref 3.8–5.2)
RBC #/AREA URNS AUTO: NORMAL /HPF
SOURCE: ABNORMAL
SP GR UR STRIP: >1.03 (ref 1–1.03)
UROBILINOGEN UR STRIP-ACNC: 0.2 EU/DL (ref 0.2–1)
WBC # BLD AUTO: 6.1 10E9/L (ref 4–11)
WBC #/AREA URNS AUTO: NORMAL /HPF

## 2020-07-03 PROCEDURE — 99214 OFFICE O/P EST MOD 30 MIN: CPT | Performed by: INTERNAL MEDICINE

## 2020-07-03 PROCEDURE — 36415 COLL VENOUS BLD VENIPUNCTURE: CPT | Performed by: INTERNAL MEDICINE

## 2020-07-03 PROCEDURE — 85379 FIBRIN DEGRADATION QUANT: CPT | Performed by: INTERNAL MEDICINE

## 2020-07-03 PROCEDURE — 80053 COMPREHEN METABOLIC PANEL: CPT | Performed by: INTERNAL MEDICINE

## 2020-07-03 PROCEDURE — 81001 URINALYSIS AUTO W/SCOPE: CPT | Performed by: INTERNAL MEDICINE

## 2020-07-03 PROCEDURE — 85027 COMPLETE CBC AUTOMATED: CPT | Performed by: INTERNAL MEDICINE

## 2020-07-03 NOTE — PROGRESS NOTES
SUBJECTIVE:  Johnna Maher, a 35 year old female, presents for evaluation of leg swelling.  She woke with this swelling this AM; the right leg is a little better but the left persists.  Denies pain.  She has been prone to more fatigue and some exertional dyspnea.  She denies chest pain, cough, chest tightness or wheeze.  No abdominal pain.  Appetite has been normal. Denies polyuria, dysuria, hematuria, frothy urine.  No change in bowel movements. Today is the last day of her menstrual period.  She is cycling every 28 days, 4 days. No change in volume of menses.    FMH: denies family history of heart or renal disease; denies rheumatologic disease    PMH: no chronic medical conditions, did have swelling with pregnancy, no prior hospitalizations    OBJECTIVE:  /80   Pulse 70   Temp 98.2  F (36.8  C) (Oral)   Resp 14   LMP 06/26/2020   Breastfeeding No   GENERAL: healthy, alert and no distress  HENT: ear canals and TM's normal and nose and mouth without ulcers or lesions  NECK: no jugular venous distention; JVP estimated < 5 cm H2O  RESP: clear to auscultation and percussion bilaterally; normal I:E ratio  CV: regular rates and rhythm, normal S1 S2, no S3 or S4 and no murmur, click or rub -  ABDOMEN: soft, nontender, without hepatosplenomegaly or masses and bowel sounds normal  EXT: peripheral pulses are brisk and symmetric in upper and lower extremities and trace pitting edema of the right foot, 1+ pitting edema of the left foot and ankle  SKIN: no suspicious lesions or rashes    LAB:   Recent Labs   Lab Test 07/03/20  1707 05/05/18  1000   WBC 6.1 8.2   RBC 4.61 4.55   HGB 11.9 11.7   HCT 33.8* 33.4*   MCV 73* 73*   MCH 25.8* 25.7*   MCHC 35.2 35.0   RDW 14.2 14.9    218     Component      Latest Ref Rng & Units 7/3/2020   D-Dimer      0.0 - 0.50 ug/ml FEU 0.3     UA RESULTS:  Recent Labs   Lab Test 07/03/20  1706   COLOR Yellow   APPEARANCE Clear   URINEGLC Negative   URINEBILI Negative    URINEKETONE Negative   SG >1.030   UBLD Small*   URINEPH 6.0   PROTEIN Negative   UROBILINOGEN 0.2   NITRITE Negative   LEUKEST Negative   RBCU O - 2   WBCU 0 - 5     ASSESSMENT/PLAN:    ICD-10-CM    1. Bilateral leg edema  R60.0 UA reflex to Microscopic     Comprehensive metabolic panel     CBC with platelets     D dimer, quantitative     Urine Microscopic   Considered broad differential diagnosis of bilateral leg edema including cardiomyopathy, nephrotic or nephritic disorders, venous thrombosis, venous insufficiency, hypoalbuminemia and nutritional edema, general fluid retention related to increased mineralocorticoid effect related to menstrual cycling.  Right now, labs are suggesting the latter as a diagnosis of exclusion.  Patient reassured pending results of metabolic panel.    Randy Siegel MD

## 2020-07-03 NOTE — TELEPHONE ENCOUNTER
This morning woke up and left foot is swollen up.  Edema is going to knee.  Denies fever cough and shortness of breath.  Denies injury or fall.      COVID 19 Nurse Triage Plan/Patient Instructions    Please be aware that novel coronavirus (COVID-19) may be circulating in the community. If you develop symptoms such as fever, cough, or SOB or if you have concerns about the presence of another infection including coronavirus (COVID-19), please contact your health care provider or visit [email or web address].     Disposition/Instructions    Patient to schedule an In Person Visit with provider. Reference Visit Selection Guide.    Thank you for taking steps to prevent the spread of this virus.  o Limit your contact with others.  o Wear a simple mask to cover your cough.  o Wash your hands well and often.    Resources    [facility]: About COVID-19: [email or web address]    CDC: What to Do If You're Sick: www.cdc.gov/coronavirus/2019-ncov/about/steps-when-sick.html    CDC: Ending Home Isolation: www.cdc.gov/coronavirus/2019-ncov/hcp/disposition-in-home-patients.html     CDC: Caring for Someone: www.cdc.gov/coronavirus/2019-ncov/if-you-are-sick/care-for-someone.html     [facility]: Interim Guidance for Hospital Discharge to Home: www.health.[email or web address].[place]/diseases/coronavirus/hcp/hospdischarge.pdf    [facility] clinical trials (COVID-19 research studies): [email or web address]-clinical-trials     Below are the COVID-19 hotlines at the [facility] of Health ([facility]). Interpreters are available.   o For health questions: Call 716-793-1792 or 1-760.151.2027 (7 a.m. to 7 p.m.)  o For questions about schools and childcare: Call 483-620-2898 or 1-595.245.4861 (7 a.m. to 7 p.m.)                       Additional Information    Negative: Severe difficulty breathing (e.g., struggling for each breath, speaks in single words)    Negative: Looks like a broken bone or dislocated joint (e.g., crooked or  deformed)    Negative: Sounds like a life-threatening emergency to the triager    Negative: Difficulty breathing at rest    Negative: Entire foot is cool or blue in comparison to other side    Negative: [1] Can't walk or can barely walk AND [2] new onset    Negative: [1] Difficulty breathing with exertion (e.g., walking) AND [2] new onset or worsening    Negative: [1] Red area or streak AND [2] fever    Negative: [1] Swelling is painful to touch AND [2] fever    Negative: [1] Cast on leg or ankle AND [2] now increased pain    Negative: Patient sounds very sick or weak to the triager    Negative: SEVERE leg swelling (e.g., swelling extends above knee, entire leg is swollen, weeping fluid)    Negative: [1] Red area or streak [2] large (> 2 in. or 5 cm)    Negative: [1] Thigh or calf pain AND [2] only 1 side AND [3] present > 1 hour    Negative: [1] Thigh, calf, or ankle swelling AND [2] only 1 side    Negative: [1] Thigh, calf, or ankle swelling AND [2] bilateral AND [3] 1 side is more swollen    [1] MODERATE leg swelling (e.g., swelling extends up to knees) AND [2] new onset or worsening    Protocols used: LEG SWELLING AND EDEMA-A-AH

## 2020-07-03 NOTE — TELEPHONE ENCOUNTER
Telephone call from spouse and is talking for his wife.  Pt has never been seen but has concerns with two days of feet swelling.    Would like to see a PCP physician as they recently moved and appt with Dr. Powell is 7/29/2020    States they do not have a PCP    Denies cardiac history, denies shortness of breath.  States she is not pregnant    Advised evaluation TODAY in [facility] also given phone number to see if appt available     Spouse voices understanding to above and states he will take her to UC if unable to get an appt today

## 2020-07-05 LAB
ALBUMIN SERPL-MCNC: 3.5 G/DL (ref 3.4–5)
ALP SERPL-CCNC: 52 U/L (ref 40–150)
ALT SERPL W P-5'-P-CCNC: 31 U/L (ref 0–50)
ANION GAP SERPL CALCULATED.3IONS-SCNC: 3 MMOL/L (ref 3–14)
AST SERPL W P-5'-P-CCNC: 20 U/L (ref 0–45)
BILIRUB SERPL-MCNC: 0.3 MG/DL (ref 0.2–1.3)
BUN SERPL-MCNC: 11 MG/DL (ref 7–30)
CALCIUM SERPL-MCNC: 8.7 MG/DL (ref 8.5–10.1)
CHLORIDE SERPL-SCNC: 107 MMOL/L (ref 94–109)
CO2 SERPL-SCNC: 26 MMOL/L (ref 20–32)
CREAT SERPL-MCNC: 0.67 MG/DL (ref 0.52–1.04)
GFR SERPL CREATININE-BSD FRML MDRD: >90 ML/MIN/{1.73_M2}
GLUCOSE SERPL-MCNC: 81 MG/DL (ref 70–99)
POTASSIUM SERPL-SCNC: 4.2 MMOL/L (ref 3.4–5.3)
PROT SERPL-MCNC: 8.1 G/DL (ref 6.8–8.8)
SODIUM SERPL-SCNC: 136 MMOL/L (ref 133–144)

## 2020-11-17 ENCOUNTER — OFFICE VISIT (OUTPATIENT)
Dept: FAMILY MEDICINE | Facility: CLINIC | Age: 35
End: 2020-11-17
Payer: COMMERCIAL

## 2020-11-17 VITALS
OXYGEN SATURATION: 99 % | SYSTOLIC BLOOD PRESSURE: 110 MMHG | BODY MASS INDEX: 34.55 KG/M2 | DIASTOLIC BLOOD PRESSURE: 72 MMHG | TEMPERATURE: 98.7 F | HEIGHT: 66 IN | HEART RATE: 90 BPM | WEIGHT: 215 LBS

## 2020-11-17 DIAGNOSIS — G89.29 CHRONIC BILATERAL LOW BACK PAIN WITHOUT SCIATICA: ICD-10-CM

## 2020-11-17 DIAGNOSIS — F43.29 ADJUSTMENT DISORDER WITH OTHER SYMPTOM: ICD-10-CM

## 2020-11-17 DIAGNOSIS — Z23 NEED FOR PROPHYLACTIC VACCINATION AND INOCULATION AGAINST INFLUENZA: Primary | ICD-10-CM

## 2020-11-17 DIAGNOSIS — N94.6 DYSMENORRHEA: ICD-10-CM

## 2020-11-17 DIAGNOSIS — Z28.39 INCOMPLETE IMMUNIZATION STATUS: ICD-10-CM

## 2020-11-17 DIAGNOSIS — M54.50 CHRONIC BILATERAL LOW BACK PAIN WITHOUT SCIATICA: ICD-10-CM

## 2020-11-17 LAB — HGB BLD-MCNC: 11.7 G/DL (ref 11.7–15.7)

## 2020-11-17 PROCEDURE — 86704 HEP B CORE ANTIBODY TOTAL: CPT | Performed by: NURSE PRACTITIONER

## 2020-11-17 PROCEDURE — 86735 MUMPS ANTIBODY: CPT | Mod: 90 | Performed by: NURSE PRACTITIONER

## 2020-11-17 PROCEDURE — 99214 OFFICE O/P EST MOD 30 MIN: CPT | Mod: 25 | Performed by: NURSE PRACTITIONER

## 2020-11-17 PROCEDURE — 90472 IMMUNIZATION ADMIN EACH ADD: CPT | Performed by: NURSE PRACTITIONER

## 2020-11-17 PROCEDURE — 85018 HEMOGLOBIN: CPT | Performed by: NURSE PRACTITIONER

## 2020-11-17 PROCEDURE — 99000 SPECIMEN HANDLING OFFICE-LAB: CPT | Performed by: NURSE PRACTITIONER

## 2020-11-17 PROCEDURE — 86762 RUBELLA ANTIBODY: CPT | Performed by: NURSE PRACTITIONER

## 2020-11-17 PROCEDURE — 87340 HEPATITIS B SURFACE AG IA: CPT | Performed by: NURSE PRACTITIONER

## 2020-11-17 PROCEDURE — 90686 IIV4 VACC NO PRSV 0.5 ML IM: CPT | Performed by: NURSE PRACTITIONER

## 2020-11-17 PROCEDURE — 83540 ASSAY OF IRON: CPT | Performed by: NURSE PRACTITIONER

## 2020-11-17 PROCEDURE — 86787 VARICELLA-ZOSTER ANTIBODY: CPT | Performed by: NURSE PRACTITIONER

## 2020-11-17 PROCEDURE — 90471 IMMUNIZATION ADMIN: CPT | Performed by: NURSE PRACTITIONER

## 2020-11-17 PROCEDURE — 86481 TB AG RESPONSE T-CELL SUSP: CPT | Performed by: NURSE PRACTITIONER

## 2020-11-17 PROCEDURE — 86765 RUBEOLA ANTIBODY: CPT | Performed by: NURSE PRACTITIONER

## 2020-11-17 PROCEDURE — 36415 COLL VENOUS BLD VENIPUNCTURE: CPT | Performed by: NURSE PRACTITIONER

## 2020-11-17 PROCEDURE — 90715 TDAP VACCINE 7 YRS/> IM: CPT | Performed by: NURSE PRACTITIONER

## 2020-11-17 PROCEDURE — 83550 IRON BINDING TEST: CPT | Performed by: NURSE PRACTITIONER

## 2020-11-17 PROCEDURE — 86706 HEP B SURFACE ANTIBODY: CPT | Performed by: NURSE PRACTITIONER

## 2020-11-17 ASSESSMENT — MIFFLIN-ST. JEOR: SCORE: 1692.98

## 2020-11-17 NOTE — PROGRESS NOTES
SUBJECTIVE:  Johnna Maher, a 35 year old female, here today for an appointment to establish care and to discuss the following issues:    1. Would like to go over immunizations to see if up to date. She is taking pre-requisites for nursing school. Overall feeling well. No coughing, wheezing, hemoptysis. Has not had TB testing in the past. Originally from [place], moved to the  in the past ten years.  2. Mood and memory. Has a history of issues with her parents and parents-in-law. She chronically feels upset with how her parents-in-law have treated her in the past, although she is working on limiting contact with them. She has had issues with schooling in terms of deciding what to do; she feels comfortable with her decision to pursue nursing now. She has had some issues with her memory within the past year, feels like she forgets things quickly, or has to have things repeated several times. She thinks part of it may be a language issue. This is also the first time she has taken science courses as an adult. Gets to sleep well at night; no issues with appetite. Sometimes eats too much when she is studying all day at home. No anxiety/panic attacks. Does not forget to complete tasks; no dangerous mistakes. Does not get lost in familiar locations. Feels comfortable with her partner and friends in the [facility].   3. Ongoing back pain several years; no accident or injury that she remembers. Noticed pain after pregnancy. Has not had any physical therapy in the past. Has not had any medications for pain; does not taken anything on a regular basis. No trouble with lifting.      HPI:    Where was your previous physician's office?: [facility]   Physician's Name: does not know   Last physical: Does not remember   Pt permission to access these records? (Care Everywhere or JOE): Yes     If patient has been seen at a [facility] before, please disregard the question below.      Past Medical History:   Diagnosis  "Date     History of ovarian cyst      IUD (intrauterine device) in place        Past Surgical History:   Procedure Laterality Date     BRONCHOSCOPY (RIGID OR FLEXIBLE), DIAGNOSTIC N/A 1/2/2019    Procedure: Bronchoscopy with Lavage;  Surgeon: Rima Camacho MD;  Location:  GI       History reviewed. No pertinent family history.    Social History     Tobacco Use     Smoking status: Never Smoker     Smokeless tobacco: Never Used   Substance Use Topics     Alcohol use: No     Frequency: Never       ROS:  ENT/MOUTH: NEGATIVE for ear, mouth and throat problems  CV: NEGATIVE for chest pain, palpitations   GI: NEGATIVE for nausea, abdominal pain, heartburn, or change in bowel habits  : NEGATIVE for frequency, dysuria, or hematuria; occasional heavy periods  C: NEGATIVE for fever, chills  E: NEGATIVE for vision changes   R: NEGATIVE for significant cough or SOB  M: NEGATIVE for significant arthralgias or myalgia  N: NEGATIVE for weakness, dizziness or paresthesias or headache    OBJECTIVE:    /72   Pulse 90   Temp 98.7  F (37.1  C) (Temporal)   Ht 1.67 m (5' 5.75\")   Wt 97.5 kg (215 lb)   SpO2 99%   BMI 34.97 kg/m      EXAM:  GENERAL APPEARANCE: healthy, alert and no distress  EYES: EOMI,  PERRL  HENT: ear canals and TM's normal and nose and mouth without ulcers or lesions  RESP: lungs clear to auscultation - no rales, rhonchi or wheezes  CV: regular rates and rhythm, normal S1 S2, no S3 or S4 and no murmur, click or rub -  ABDOMEN:  soft, nontender, no HSM or masses and bowel sounds normal  SKIN: no suspicious lesions or rashes  NEURO: Normal strength and tone, sensory exam grossly normal, mentation intact and speech normal  Back: Normal ROM of back; mild tenderness to bilateral paralumbar muscles; negative SLR bilaterally  PSYCH: mentation appears normal and affect normal/bright    ASSESSMENT/PLAN:      ICD-10-CM    1. Need for prophylactic vaccination and inoculation against influenza  Z23 INFLUENZA " VACCINE IM > 6 MONTHS VALENT IIV4 [91284]   2. Incomplete immunization status  Z28.3 Quantiferon TB Gold Plus     TDAP VACCINE (Adacel, Boostrix)  [9608314]     Rubella antibody IgM     Mumps Immune Status, IgG     Rubeola Antibody IgG     Varicella Zoster Virus Antibody IgG     Hepatitis B Surface Antibody     Hepatitis B core antibody     Hepatitis B surface antigen     CANCELED: FLU VAC QUAD SPLIT VIR, IM (6+ MO)   3. Adjustment disorder with other symptom  F43.29 MENTAL HEALTH REFERRAL  - Adult; Outpatient Treatment; Individual/Couples/Family/Group Therapy/Health Psychology; [facility]: [facility] [phone]; We will contact you to schedule the appointment or please call with any questions   4. Chronic bilateral low back pain without sciatica  M54.5 PHYSICAL THERAPY REFERRAL    G89.29    5. Dysmenorrhea  N94.6 Iron and iron binding capacity     Hemoglobin     Discussed memory in terms of school work and mood; no dangerous or concerning signs so far and discussed it may be due to being an adult learner in unfamiliar subject matter. May be related to ongoing family stress; discussed counseling referral. Follow up if symptoms change or worsen.

## 2020-11-18 LAB
HBV CORE AB SERPL QL IA: NONREACTIVE
HBV SURFACE AB SERPL IA-ACNC: 0 M[IU]/ML
HBV SURFACE AG SERPL QL IA: NONREACTIVE
IRON SATN MFR SERPL: 19 % (ref 15–46)
IRON SERPL-MCNC: 62 UG/DL (ref 35–180)
TIBC SERPL-MCNC: 331 UG/DL (ref 240–430)

## 2020-11-19 LAB
MEV IGG SER QL IA: 5.4 AI (ref 0–0.8)
MUV IGG SER QL IA: 5.6 AI (ref 0–0.8)
RUBV IGM SER QL: <0.2 AI (ref 0–0.8)
VZV IGG SER QL IA: 3.8 AI (ref 0–0.8)

## 2020-11-20 LAB
GAMMA INTERFERON BACKGROUND BLD IA-ACNC: 0.12 IU/ML
M TB IFN-G CD4+ BCKGRND COR BLD-ACNC: 9.88 IU/ML
M TB TUBERC IFN-G BLD QL: POSITIVE
MITOGEN IGNF BCKGRD COR BLD-ACNC: 9.88 IU/ML
MITOGEN IGNF BCKGRD COR BLD-ACNC: 9.88 IU/ML

## 2020-11-23 ENCOUNTER — TELEPHONE (OUTPATIENT)
Dept: FAMILY MEDICINE | Facility: CLINIC | Age: 35
End: 2020-11-23

## 2020-11-23 ENCOUNTER — NURSE TRIAGE (OUTPATIENT)
Dept: NURSING | Facility: CLINIC | Age: 35
End: 2020-11-23

## 2020-11-23 DIAGNOSIS — Z22.7 LATENT TUBERCULOSIS BY BLOOD TEST: Primary | ICD-10-CM

## 2020-11-23 NOTE — TELEPHONE ENCOUNTER
Left vm for patient to let her know chest xray ordered and gave number to call.    Ana Dominguez RN   [facility]

## 2020-11-23 NOTE — TELEPHONE ENCOUNTER
Ivelisse,     Pt notified of Quantiferon gold test results. Scheduled for follow up on 11/30/20 as well as immunizations with nurse tomorrow.     Do you want her to have chest XR prior to your visit to determine treatment? Pended CXR.      Lyndsay Braga RN   [facility] [place] [facility]

## 2020-11-23 NOTE — TELEPHONE ENCOUNTER
Patient calling in for lab work.     Johnna likely has a latent TB infection. 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 She can follow up with me to discuss this.   She also has to get a Hepatitis B vaccination, and an MMR vaccination. Iron and hemoglobin are normal      She was given the results.   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 Transferred to scheduling to make the appointment for a follow up.   Crystal Villavicencio RN on 11/23/2020 at 10:47 AM    Reason for Disposition    Caller requesting lab results    Additional Information    Negative: ED call to PCP    Negative: Physician call to PCP    Negative: Call about patient who is currently hospitalized    Negative: Lab or radiology calling with CRITICAL test results    Negative: [1] Prescription not at pharmacy AND [2] was prescribed today by PCP    Negative: [1] Follow-up call from patient regarding patient's clinical status AND [2] information urgent    Negative: [1] Caller requests to speak ONLY to PCP AND [2] urgent question    Negative: [1] Caller requests to speak to PCP now AND [2] won't tell us reason for call  (Exception: if 10 pm to 6 am, caller must first discuss reason for the call)    Negative: Notification of hospital admission    Negative: Notification of death    Negative: Lab calling with strep throat test results and triager can call in prescription    Negative: Lab calling with urinalysis test results and triager can call in prescription    Negative: Medication questions    Protocols used: [facility]

## 2020-11-24 ENCOUNTER — ALLIED HEALTH/NURSE VISIT (OUTPATIENT)
Dept: FAMILY MEDICINE | Facility: CLINIC | Age: 35
End: 2020-11-24
Payer: COMMERCIAL

## 2020-11-24 DIAGNOSIS — Z23 NEED FOR VACCINATION: Primary | ICD-10-CM

## 2020-11-24 PROCEDURE — 99207 PR NO CHARGE NURSE ONLY: CPT

## 2020-11-24 PROCEDURE — 90471 IMMUNIZATION ADMIN: CPT

## 2020-11-24 PROCEDURE — 90746 HEPB VACCINE 3 DOSE ADULT IM: CPT

## 2020-11-24 PROCEDURE — 90472 IMMUNIZATION ADMIN EACH ADD: CPT

## 2020-11-24 PROCEDURE — 90707 MMR VACCINE SC: CPT

## 2020-11-24 NOTE — PROGRESS NOTES
Prior to immunization administration, verified patients identity using patient s name and date of birth. Please see Immunization Activity for additional information.     Screening Questionnaire for Adult Immunization    Are you sick today?   No   Do you have allergies to medications, food, a vaccine component or latex?   No   Have you ever had a serious reaction after receiving a vaccination?   No   Do you have a long-term health problem with heart, lung, kidney, or metabolic disease (e.g., diabetes), asthma, a blood disorder, no spleen, complement component deficiency, a cochlear implant, or a spinal fluid leak?  Are you on long-term aspirin therapy?   No   Do you have cancer, leukemia, HIV/AIDS, or any other immune system problem?   No   Do you have a parent, brother, or sister with an immune system problem?   No   In the past 3 months, have you taken medications that affect  your immune system, such as prednisone, other steroids, or anticancer drugs; drugs for the treatment of rheumatoid arthritis, Crohn s disease, or psoriasis; or have you had radiation treatments?   No   Have you had a seizure, or a brain or other nervous system problem?   No   During the past year, have you received a transfusion of blood or blood    products, or been given immune (gamma) globulin or antiviral drug?   No   For women: Are you pregnant or is there a chance you could become       pregnant during the next month?   No   Have you received any vaccinations in the past 4 weeks?   No     Immunization questionnaire answers were all negative.        Per orders of Dr. Leida MORENO , injection of MMR and Hep B  given by Sole Christianson MA. Patient instructed to remain in clinic for 15 minutes afterwards, and to report any adverse reaction to me immediately.       Screening performed by Sole Christianson MA on 11/24/2020 at 11:31 AM.

## 2020-11-25 ENCOUNTER — HOSPITAL ENCOUNTER (OUTPATIENT)
Dept: GENERAL RADIOLOGY | Facility: CLINIC | Age: 35
Discharge: HOME | End: 2020-11-25
Attending: NURSE PRACTITIONER | Admitting: NURSE PRACTITIONER
Payer: COMMERCIAL

## 2020-11-25 DIAGNOSIS — Z22.7 LATENT TUBERCULOSIS BY BLOOD TEST: ICD-10-CM

## 2020-11-25 PROCEDURE — 71046 X-RAY EXAM CHEST 2 VIEWS: CPT | Mod: 26 | Performed by: RADIOLOGY

## 2020-11-25 PROCEDURE — 71046 X-RAY EXAM CHEST 2 VIEWS: CPT

## 2020-11-30 ENCOUNTER — OFFICE VISIT (OUTPATIENT)
Dept: FAMILY MEDICINE | Facility: CLINIC | Age: 35
End: 2020-11-30
Payer: COMMERCIAL

## 2020-11-30 VITALS
DIASTOLIC BLOOD PRESSURE: 82 MMHG | WEIGHT: 221.31 LBS | SYSTOLIC BLOOD PRESSURE: 116 MMHG | HEART RATE: 82 BPM | TEMPERATURE: 98 F | OXYGEN SATURATION: 100 % | HEIGHT: 66 IN | BODY MASS INDEX: 35.57 KG/M2 | RESPIRATION RATE: 16 BRPM

## 2020-11-30 DIAGNOSIS — Z22.7 LATENT TUBERCULOSIS BY BLOOD TEST: Primary | ICD-10-CM

## 2020-11-30 PROCEDURE — 99213 OFFICE O/P EST LOW 20 MIN: CPT | Performed by: NURSE PRACTITIONER

## 2020-11-30 RX ORDER — ISONIAZID 300 MG/1
300 TABLET ORAL DAILY
Qty: 90 TABLET | Refills: 1 | Status: SHIPPED | OUTPATIENT
Start: 2020-11-30 | End: 2021-12-27

## 2020-11-30 ASSESSMENT — MIFFLIN-ST. JEOR: SCORE: 1721.62

## 2020-11-30 NOTE — PROGRESS NOTES
"Subjective     Johnna Maher is a 35 year old female who presents to clinic today for the following health issues:    HPI         Patient is here to follow up on lab results on 11/17/2020. No symptoms of cough, shortness of breath, or unintentional weight loss. 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          Review of Systems   Constitutional, HEENT, cardiovascular, pulmonary, gi and gu systems are negative, except as otherwise noted.      Objective    /82   Pulse 82   Temp 98  F (36.7  C) (Temporal)   Resp 16   Ht 1.67 m (5' 5.75\")   Wt 100.4 kg (221 lb 5 oz)   SpO2 100%   BMI 36.00 kg/m    Body mass index is 36 kg/m .  Physical Exam   GENERAL: healthy, alert and no distress  NECK: no adenopathy, no asymmetry, masses, or scars and thyroid normal to palpation  RESP: lungs clear to auscultation - no rales, rhonchi or wheezes  CV: regular rate and rhythm, normal S1 S2, no S3 or S4, no murmur, click or rub, no peripheral edema and peripheral pulses strong  ABDOMEN: soft, nontender, no hepatosplenomegaly, no masses and bowel sounds normal  MS: no gross musculoskeletal defects noted, no edema    No results found for this or any previous visit (from the past 24 hour(s)).        Assessment & Plan   Problem List Items Addressed This Visit     Latent tuberculosis by blood test - Primary    Relevant Medications    isoniazid (NYDRAZID) 300 MG tablet         Recheck CMP in two months    BMI:   Estimated body mass index is 36 kg/m  as calculated from the following:    Height as of this encounter: 1.67 m (5' 5.75\").    Weight as of this encounter: 100.4 kg (221 lb 5 oz).              See Patient Instructions  No follow-ups on file.    [facility]  [facility] [place]    "

## 2020-12-22 ENCOUNTER — TELEPHONE (OUTPATIENT)
Dept: FAMILY MEDICINE | Facility: CLINIC | Age: 35
End: 2020-12-22

## 2020-12-22 ENCOUNTER — ALLIED HEALTH/NURSE VISIT (OUTPATIENT)
Dept: FAMILY MEDICINE | Facility: CLINIC | Age: 35
End: 2020-12-22
Payer: COMMERCIAL

## 2020-12-22 DIAGNOSIS — Z23 NEED FOR VACCINATION: Primary | ICD-10-CM

## 2020-12-22 PROCEDURE — 90746 HEPB VACCINE 3 DOSE ADULT IM: CPT

## 2020-12-22 PROCEDURE — 99207 PR NO CHARGE NURSE ONLY: CPT

## 2020-12-22 PROCEDURE — 90716 VAR VACCINE LIVE SUBQ: CPT

## 2020-12-22 PROCEDURE — 90471 IMMUNIZATION ADMIN: CPT

## 2020-12-22 PROCEDURE — 90472 IMMUNIZATION ADMIN EACH ADD: CPT

## 2020-12-22 PROCEDURE — 90707 MMR VACCINE SC: CPT

## 2020-12-22 PROCEDURE — 90715 TDAP VACCINE 7 YRS/> IM: CPT

## 2020-12-22 NOTE — TELEPHONE ENCOUNTER
Reason for call:  Form   Our goal is to have forms completed within 72 hours, however some forms may require a visit or additional information.     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 Patient or family brought in     What clinic location was the form placed at? With Alice Lamas MA during visit  Where was the form placed? placed in [employer] folder for signature   What number is listed as a contact on the form? Patient will  after completed.     Phone call message - patient request for a letter, form or note:     Date needed: as soon as possible  Patient will  at the clinic when completed  Has the patient signed a consent form for release of information? Not Applicable    Additional comments: pt will come get forms when completed.     Type of letter, form or note: school      Phone number to reach patient:  Home number on file [phone] (home)    Best Time:  Any

## 2020-12-29 NOTE — TELEPHONE ENCOUNTER
Forms completed and left at  [facility] 6th floor for . Pt informed via voicemail they are ready. Copy faxed to urgent HIM scanning for chart.

## 2021-01-03 ENCOUNTER — MYC MEDICAL ADVICE (OUTPATIENT)
Dept: FAMILY MEDICINE | Facility: CLINIC | Age: 36
End: 2021-01-03

## 2021-01-04 ENCOUNTER — VIRTUAL VISIT (OUTPATIENT)
Dept: FAMILY MEDICINE | Facility: CLINIC | Age: 36
End: 2021-01-04
Payer: COMMERCIAL

## 2021-01-04 ENCOUNTER — NURSE TRIAGE (OUTPATIENT)
Dept: NURSING | Facility: CLINIC | Age: 36
End: 2021-01-04

## 2021-01-04 DIAGNOSIS — Z20.822 SUSPECTED COVID-19 VIRUS INFECTION: ICD-10-CM

## 2021-01-04 DIAGNOSIS — R05.9 COUGH: Primary | ICD-10-CM

## 2021-01-04 DIAGNOSIS — Z22.7 LATENT TUBERCULOSIS BY BLOOD TEST: ICD-10-CM

## 2021-01-04 DIAGNOSIS — R05.9 COUGH: ICD-10-CM

## 2021-01-04 PROCEDURE — U0005 INFEC AGEN DETEC AMPLI PROBE: HCPCS | Performed by: PHYSICIAN ASSISTANT

## 2021-01-04 PROCEDURE — U0003 INFECTIOUS AGENT DETECTION BY NUCLEIC ACID (DNA OR RNA); SEVERE ACUTE RESPIRATORY SYNDROME CORONAVIRUS 2 (SARS-COV-2) (CORONAVIRUS DISEASE [COVID-19]), AMPLIFIED PROBE TECHNIQUE, MAKING USE OF HIGH THROUGHPUT TECHNOLOGIES AS DESCRIBED BY CMS-2020-01-R: HCPCS | Performed by: PHYSICIAN ASSISTANT

## 2021-01-04 PROCEDURE — 99214 OFFICE O/P EST MOD 30 MIN: CPT | Mod: 95 | Performed by: PHYSICIAN ASSISTANT

## 2021-01-04 NOTE — TELEPHONE ENCOUNTER
Patient  calling.   She has headache,  Loss sense of smell, feeling weak and tired.    Patient has been ill with Covid symptoms since   Dec. 26th her  states.  She has not been tested yet , and they have a message in My Chart asking their provider for a   covid test.    Advised patient to get tested , and call her school where she attends to ask them when she is allowed to return to class.  Claritza Cabrales RN on 1/4/2021 at 8:50 AM          Reason for Disposition    [1] Continuous (nonstop) coughing interferes with work or school AND [2] no improvement using cough treatment per protocol    [1] COVID-19 infection suspected by caller or triager AND [2] mild symptoms (cough, fever, or others) AND [3] no complications or SOB    Protocols used: CORONAVIRUS (COVID-19) DIAGNOSED OR [id number] 12.1

## 2021-01-04 NOTE — PATIENT INSTRUCTIONS
"Discharge Instructions for COVID-19 Patients  You have--or may have--COVID-19. Please follow the instructions listed below.   If you have a weakened immune system, discuss with your doctor any other actions you need to take.  How can I protect others?  If you have symptoms (fever, cough, body aches or trouble breathing):    Stay home and away from others (self-isolate) until:  ? At least 10 days have passed since your symptoms started, And   ? You've had no fever--and no medicine that reduces fever--for 1 full day (24 hours), And    ? Your other symptoms have resolved (gotten better).  If you don't show symptoms, but testing showed that you have COVID-19:    Stay home and away from others (self-isolate). Follow the tips under \"How do I self-isolate?\" below for 10 days (20 days if you have a weak immune system).    You don't need to be retested for COVID-19 before going back to school or work. As long as you're fever-free and feeling better, you can go back to school, work and other activities after waiting the 10 or 20 days.   How do I self-isolate?    Stay in your own room, even for meals. Use your own bathroom if you can.    Stay away from others in your home. No hugging, kissing or shaking hands. No visitors.    Don't go to work, school or anywhere else.    Clean \"high touch\" surfaces often (doorknobs, counters, handles). Use household cleaning spray or wipes. You'll find a full list of  on the EPA website: www.epa.gov/pesticide-registration/list-n-disinfectants-use-against-sars-cov-2.    Cover your mouth and nose with a mask or other face covering to avoid spreading germs.    Wash your hands and face often. Use soap and water.    Caregivers in these groups are at risk for severe illness due to COVID-19:  ? People 65 years and older  ? People who live in a nursing home or long-term care facility  ? People with chronic disease (lung, heart, cancer, diabetes, kidney, liver, immunologic)  ? People who have a " weakened immune system, including those who:    Are in cancer treatment    Take medicine that weakens the immune system, such as corticosteroids    Had a bone marrow or organ transplant    Have an immune deficiency    Have poorly controlled HIV or AIDS    Are obese (body mass index of 40 or higher)    Smoke regularly    Caregivers should wear gloves while washing dishes, handling laundry and cleaning bedrooms and bathrooms.    Use caution when washing and drying laundry: Don't shake dirty laundry and use the warmest water setting that you can.    For more tips on managing your health at home, go to www.cdc.gov/coronavirus/2019-ncov/downloads/10Things.pdf.  How can I take care of myself at home?  1. Get lots of rest. Drink extra fluids (unless a doctor has told you not to).    2. Take Tylenol (acetaminophen) for fever or pain. If you have liver or kidney problems, ask your family doctor if it's okay to take Tylenol.     Adults can take either:  ? 650 mg (two 325 mg pills) every 4 to 6 hours, or   ? 1,000 mg (two 500 mg pills) every 8 hours as needed.  ? Note: Don't take more than 3,000 mg in one day. Acetaminophen is found in many medicines (both prescribed and over-the-counter medicines). Read all labels to be sure you don't take too much.   For children, check the Tylenol bottle for the right dose. The dose is based on the child's age or weight.  3. If you have other health problems (like cancer, heart failure, an organ transplant or severe kidney disease): Call your specialty clinic if you don't feel better in the next 2 days.    4. Know when to call 911. Emergency warning signs include:  ? Trouble breathing or shortness of breath  ? Pain or pressure in the chest that doesn't go away  ? Feeling confused like you haven't felt before, or not being able to wake up  ? Bluish-colored lips or face    5. Your doctor may have prescribed a blood thinner medicine. Follow their instructions.  Where can I get more  information?    [facility] - About COVID-19: [email or web address]    CDC - What to Do If You're Sick: www.cdc.gov/coronavirus/2019-ncov/about/steps-when-sick.html    CDC - Ending Home Isolation: www.cdc.gov/coronavirus/2019-ncov/hcp/disposition-in-home-patients.html    CDC - Caring for Someone: www.cdc.gov/coronavirus/2019-ncov/if-you-are-sick/care-for-someone.html    [facility] - Interim Guidance for Hospital Discharge to Home: www.health.[facility].[place]./diseases/coronavirus/hcp/hospdischarge.pdf    [facility] clinical trials (COVID-19 research studies): clinicalaffairs.[facility]-clinical-trials    Below are the COVID-19 hotlines at the [place] Department of Health ([facility]). Interpreters are available.  ? For health questions: Call 026-627-3741 or 1-114.327.6725 (7 a.m. to 7 p.m.)  ? For questions about schools and childcare: Call 261-675-7407 or 1-584.765.5924 (7 a.m. to 7 p.m.)    For informational purposes only. Not to replace the advice of your health care provider. Clinically reviewed by the Infection Prevention Team. Copyright   2020 [employer]. All rights reserved. [employer] 935372 - REV 08/04/20.

## 2021-01-04 NOTE — TELEPHONE ENCOUNTER
Continues with loss of smell    Denies fever, SOB, chest pain, severe weakness, dehydration, feeling faint    Scheduled appointment 1/4/2021

## 2021-01-04 NOTE — PROGRESS NOTES
Johnna Maher is a 35 year old female who is being evaluated via a billable video visit.      How would you like to obtain your AVS? MyChart  If the video visit is dropped, the invitation should be resent by: Text to cell phone: [phone]  Will anyone else be joining your video visit? No    Video Start Time: 10:45      Subjective     Johnna Maher is a 35 year old female who presents to clinic today for the following health issues     HPI         Concern for COVID-19  About how many days ago did these symptoms start? Dec 25  Is this your first visit for this illness? Yes  In the 14 days before your symptoms started, have you had close contact with someone with COVID-19 (Coronavirus)? No  Do you have a fever or chills? No  Are you having new or worsening difficulty breathing? No  Do you have new or worsening cough? Yes, it's a dry cough.   Have you had any new or unexplained body aches? No    Have you experienced any of the following NEW symptoms?    Headache: YES    Sore throat: YES    Loss of taste or smell: YES    Chest pain: No    Diarrhea: No    Rash: No  What treatments have you tried? Tyl  Who do you live with? Spouse and 2 children  Are you, or a household member, a healthcare worker or a ? No  Do you live in a nursing home, group home, or shelter? No  Do you have a way to get food/medications if quarantined? Yes, I have a friend or family member who can help me.    Reports she is feeling better  No fever, not taking antipyretics     She is in nursing school  Needs a form signed, was told she can come pick this up      Orientation program is tomorrow for her school  Wonders how to handle this    Review of Systems   INTEGUMENTARY/SKIN: NEGATIVE for worrisome rashes, moles or lesions  EYES: NEGATIVE for vision changes or irritation  ENT/MOUTH: NEGATIVE for ear, mouth and throat problems  RESP:NEGATIVE for hemoptysis and pleurisy  CV: NEGATIVE for chest pain, palpitations or  peripheral edema  GI: NEGATIVE for nausea, abdominal pain, heartburn, or change in bowel habits  : NEGATIVE for frequency, dysuria, or hematuria  NEURO: NEGATIVE for weakness, dizziness or paresthesias      Objective           Vitals:  No vitals were obtained today due to virtual visit.    Physical Exam   GENERAL: Healthy, alert and no distress  EYES: Eyes grossly normal to inspection.  No discharge or erythema, or obvious scleral/conjunctival abnormalities.  RESP: No audible wheeze, cough, or visible cyanosis.  No visible retractions or increased work of breathing.    SKIN: Visible skin clear. No significant rash, abnormal pigmentation or lesions.  NEURO: Cranial nerves grossly intact.  Mentation and speech appropriate for age.  PSYCH: Mentation appears normal, affect normal/bright, judgement and insight intact, normal speech and appearance well-groomed.    No results found for any visits on 01/04/21.      Assessment & Plan       ICD-10-CM    1. Cough  R05 Symptomatic COVID-19 Virus (Coronavirus) by PCR   2. Suspected COVID-19 virus infection  Z20.822 Symptomatic COVID-19 Virus (Coronavirus) by PCR   3. 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 Note sent to staff to mail a copy of her forms since she cannot come in person. See patient instructions. Return to clinic for any new or worsening symptoms or go to ER Urgent care in off hours      Review of external notes as documented above   Independent interpretation of a test performed by another physician/other qualified health care professional (not separately reported) - positive quantiferon                     Patient Instructions   Discharge Instructions for COVID-19 Patients  You have--or may have--COVID-19. Please follow the instructions listed below.   If you have a weakened immune system, discuss with your doctor any other actions you need to take.  How can I protect  "others?  If you have symptoms (fever, cough, body aches or trouble breathing):    Stay home and away from others (self-isolate) until:  ? At least 10 days have passed since your symptoms started, And   ? You've had no fever--and no medicine that reduces fever--for 1 full day (24 hours), And    ? Your other symptoms have resolved (gotten better).  If you don't show symptoms, but testing showed that you have COVID-19:    Stay home and away from others (self-isolate). Follow the tips under \"How do I self-isolate?\" below for 10 days (20 days if you have a weak immune system).    You don't need to be retested for COVID-19 before going back to school or work. As long as you're fever-free and feeling better, you can go back to school, work and other activities after waiting the 10 or 20 days.   How do I self-isolate?    Stay in your own room, even for meals. Use your own bathroom if you can.    Stay away from others in your home. No hugging, kissing or shaking hands. No visitors.    Don't go to work, school or anywhere else.    Clean \"high touch\" surfaces often (doorknobs, counters, handles). Use household cleaning spray or wipes. You'll find a full list of  on the EPA website: www.epa.gov/pesticide-registration/list-n-disinfectants-use-against-sars-cov-2.    Cover your mouth and nose with a mask or other face covering to avoid spreading germs.    Wash your hands and face often. Use soap and water.    Caregivers in these groups are at risk for severe illness due to COVID-19:  ? People 65 years and older  ? People who live in a nursing home or long-term care facility  ? People with chronic disease (lung, heart, cancer, diabetes, kidney, liver, immunologic)  ? People who have a weakened immune system, including those who:    Are in cancer treatment    Take medicine that weakens the immune system, such as corticosteroids    Had a bone marrow or organ transplant    Have an immune deficiency    Have poorly controlled " HIV or AIDS    Are obese (body mass index of 40 or higher)    Smoke regularly    Caregivers should wear gloves while washing dishes, handling laundry and cleaning bedrooms and bathrooms.    Use caution when washing and drying laundry: Don't shake dirty laundry and use the warmest water setting that you can.    For more tips on managing your health at home, go to www.cdc.gov/coronavirus/2019-ncov/downloads/10Things.pdf.  How can I take care of myself at home?  1. Get lots of rest. Drink extra fluids (unless a doctor has told you not to).    2. Take Tylenol (acetaminophen) for fever or pain. If you have liver or kidney problems, ask your family doctor if it's okay to take Tylenol.     Adults can take either:  ? 650 mg (two 325 mg pills) every 4 to 6 hours, or   ? 1,000 mg (two 500 mg pills) every 8 hours as needed.  ? Note: Don't take more than 3,000 mg in one day. Acetaminophen is found in many medicines (both prescribed and over-the-counter medicines). Read all labels to be sure you don't take too much.   For children, check the Tylenol bottle for the right dose. The dose is based on the child's age or weight.  3. If you have other health problems (like cancer, heart failure, an organ transplant or severe kidney disease): Call your specialty clinic if you don't feel better in the next 2 days.    4. Know when to call 911. Emergency warning signs include:  ? Trouble breathing or shortness of breath  ? Pain or pressure in the chest that doesn't go away  ? Feeling confused like you haven't felt before, or not being able to wake up  ? Bluish-colored lips or face    5. Your doctor may have prescribed a blood thinner medicine. Follow their instructions.  Where can I get more information?     [employer] - About COVID-19: "[email or web address]    CDC - What to Do If You're Sick: www.cdc.gov/coronavirus/2019-ncov/about/steps-when-sick.html    CDC - Ending Home Isolation:  www.cdc.gov/coronavirus/2019-ncov/hcp/disposition-in-home-patients.html    CDC - Caring for Someone: www.cdc.gov/coronavirus/2019-ncov/if-you-are-sick/care-for-someone.html    [facility] - Interim Guidance for Hospital Discharge to Home: www.health.[facility].[place]/diseases/coronavirus/hcp/hospdischarge.pdf    [facility] clinical trials (COVID-19 research studies): [email or web address].[email or web address]-[email or web address]-trials    Below are the COVID-19 hotlines at the [place] Department of Health ([facility]). Interpreters are available.  ? For health questions: Call 382-999-1160 or 1-156.718.6037 (7 a.m. to 7 p.m.)  ? For questions about schools and childcare: Call 181-532-7652 or 1-255.860.9149 (7 a.m. to 7 p.m.)    For informational purposes only. Not to replace the advice of your health care provider. Clinically reviewed by the Infection Prevention Team. Copyright   2020 [employer] [facility]. All rights reserved. [employer] 479803 - REV 08/04/20.        No follow-ups on file.    Leida Pena PA-C  [facility]        Video-Visit Details    Type of service:  Video Visit    Video End Time:11:03 AM    Originating Location (pt. Location): Home    Distant Location (provider location):  [facility]     Platform used for Video Visit: Dario

## 2021-01-04 NOTE — TELEPHONE ENCOUNTER
Patient with likely COVID, unable to get forms in person.   Please mail at [address] [facility], [place] [address] (needs this urgently

## 2021-01-05 ENCOUNTER — TELEPHONE (OUTPATIENT)
Dept: FAMILY MEDICINE | Facility: CLINIC | Age: 36
End: 2021-01-05

## 2021-01-05 LAB
SARS-COV-2 RNA SPEC QL NAA+PROBE: ABNORMAL
SPECIMEN SOURCE: ABNORMAL

## 2021-01-06 NOTE — TELEPHONE ENCOUNTER
Coronavirus (COVID-19) Notification    Reason for call  Notify of POSITIVE  COVID-19 lab result, assess symptoms,  review [facility] recommendations    Lab Result   Lab test for 2019-nCoV rRt-PCR or SARS-COV-2 PCR  Oropharyngeal AND/OR nasopharyngeal swabs were POSITIVE for 2019-nCoV RNA [OR] SARS-COV-2 RNA (COVID-19) RNA     We have been unable to reach Patient by phone at this time to notify of their Positive COVID-19 result.  Left voicemail message requesting a call back to [phone] [facility] for results.        POSITIVE COVID-19 Letter sent.    Senia Guillen LPN

## 2021-01-14 ENCOUNTER — VIRTUAL VISIT (OUTPATIENT)
Dept: PSYCHOLOGY | Facility: CLINIC | Age: 36
End: 2021-01-14
Attending: NURSE PRACTITIONER
Payer: COMMERCIAL

## 2021-01-14 DIAGNOSIS — F41.1 GAD (GENERALIZED ANXIETY DISORDER): ICD-10-CM

## 2021-01-14 DIAGNOSIS — F32.1 CURRENT MODERATE EPISODE OF MAJOR DEPRESSIVE DISORDER WITHOUT PRIOR EPISODE (H): Primary | ICD-10-CM

## 2021-01-14 PROCEDURE — 99417 PROLNG OP E/M EACH 15 MIN: CPT | Mod: 95

## 2021-01-14 ASSESSMENT — COLUMBIA-SUICIDE SEVERITY RATING SCALE - C-SSRS
ATTEMPT PAST THREE MONTHS: NO
1. IN THE PAST MONTH, HAVE YOU WISHED YOU WERE DEAD OR WISHED YOU COULD GO TO SLEEP AND NOT WAKE UP?: NO
2. HAVE YOU ACTUALLY HAD ANY THOUGHTS OF KILLING YOURSELF LIFETIME?: NO
ATTEMPT LIFETIME: NO
2. HAVE YOU ACTUALLY HAD ANY THOUGHTS OF KILLING YOURSELF?: NO
1. IN THE PAST MONTH, HAVE YOU WISHED YOU WERE DEAD OR WISHED YOU COULD GO TO SLEEP AND NOT WAKE UP?: NO
TOTAL  NUMBER OF INTERRUPTED ATTEMPTS LIFETIME: NO
TOTAL  NUMBER OF INTERRUPTED ATTEMPTS PAST 3 MONTHS: NO

## 2021-01-14 ASSESSMENT — ANXIETY QUESTIONNAIRES
3. WORRYING TOO MUCH ABOUT DIFFERENT THINGS: NEARLY EVERY DAY
5. BEING SO RESTLESS THAT IT IS HARD TO SIT STILL: NOT AT ALL
1. FEELING NERVOUS, ANXIOUS, OR ON EDGE: NEARLY EVERY DAY
7. FEELING AFRAID AS IF SOMETHING AWFUL MIGHT HAPPEN: NEARLY EVERY DAY
GAD7 TOTAL SCORE: 15
6. BECOMING EASILY ANNOYED OR IRRITABLE: NEARLY EVERY DAY
2. NOT BEING ABLE TO STOP OR CONTROL WORRYING: NEARLY EVERY DAY
4. TROUBLE RELAXING: NOT AT ALL

## 2021-01-14 ASSESSMENT — PATIENT HEALTH QUESTIONNAIRE - PHQ9: SUM OF ALL RESPONSES TO PHQ QUESTIONS 1-9: 10

## 2021-01-14 NOTE — LETTER
2021         RE: Johnna Maher  [address]  [place] [address]        Dear Colleague,    Thank you for referring your patient, Johnna Maher, to the [facility]. Please see a copy of my visit note below.                   Progress Note - Initial Visit    Client Name:  Johnna Maher Date: 2021         Service Type: Individual     Visit Start Time: 12:06pm  Visit End Time: 12:56pm    Visit #: 1    Attendees: Client attended alone    Service Modality:  Video Visit:      Provider verified identity through the following two step process.  Patient provided:  Patient photo and Patient     Telemedicine Visit: The patient's condition can be safely assessed and treated via synchronous audio and visual telemedicine encounter.      Reason for Telemedicine Visit: Services only offered telehealth    Originating Site (Patient Location): Patient's home    Distant Site (Provider Location): Provider Remote Setting    Consent:  The patient/guardian has verbally consented to: the potential risks and benefits of telemedicine (video visit) versus in person care; bill my insurance or make self-payment for services provided; and responsibility for payment of non-covered services.     Patient would like the video invitation sent by:  Send to e-mail at: [email or web address]    Mode of Communication:  Video Conference via well    As the provider I attest to compliance with applicable laws and regulations related to telemedicine.       DATA:   Interactive Complexity: No   Crisis: No     Presenting Concerns/  Current Stressors:  Patient reported that she is currently seeking therapy services to aid with coping with current psychosocial stressors. 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 The patient also reported that there is some current conflict in her native home of [place] and she is  fearful for her family still living there.     The patient is currently living with her  and children.     ASSESSMENT:  Mental Status Assessment:  Appearance:   Appropriate   Eye Contact:   Good   Psychomotor Behavior: Normal   Attitude:   Cooperative   Orientation:   All  Speech   Rate / Production: Normal/ Responsive   Volume:  Normal   Mood:    Anxious   Affect:    Worrisome   Thought Content:  Clear   Thought Form:  Coherent   Insight:    Fair       Safety Issues and Plan for Safety and Risk Management:     [place] Suicide Severity Rating Scale (Lifetime/Recent)  [place] Suicide Severity Rating (Lifetime/Recent) 1/14/2021   1. Wish to be Dead (Lifetime) No   1. Wish to be Dead (Recent) No   2. Non-Specific Active Suicidal Thoughts (Lifetime) No   2. Non-Specific Active Suicidal Thoughts (Recent) No   Actual Attempt (Lifetime) No   Actual Attempt (Past 3 Months) No   Has subject engaged in non-suicidal self-injurious behavior? (Lifetime) No   Has subject engaged in non-suicidal self-injurious behavior? (Past 3 Months) No   Interrupted Attempts (Lifetime) No   Interrupted Attempts (Past 3 Months) No     Patient denies current fears or concerns for personal safety.  Patient denies current or recent suicidal ideation or behaviors.  Patient denies current or recent homicidal ideation or behaviors.  Patient denies current or recent self injurious behavior or ideation.  Patient denies other safety concerns.  Recommended that patient call 911 or go to the local ED should there be a change in any of these risk factors.  Patient reports there are no firearms in the house.     Diagnostic Criteria:  A. Excessive anxiety and worry about a number of events or activities (such as work or school performance).   B. The person finds it difficult to control the worry.  C. Select 3 or more symptoms (required for diagnosis). Only one item is required in children.   - Restlessness or feeling keyed up or on edge.    -  Difficulty concentrating or mind going blank.    - Irritability.   D. The focus of the anxiety and worry is not confined to features of an Axis I disorder.  E. The anxiety, worry, or physical symptoms cause clinically significant distress or impairment in social, occupational, or other important areas of functioning.   F. The disturbance is not due to the direct physiological effects of a substance (e.g., a drug of abuse, a medication) or a general medical condition (e.g., hyperthyroidism) and does not occur exclusively during a Mood Disorder, a Psychotic Disorder, or a Pervasive Developmental Disorder.    - The aformentioned symptoms began 2 year(s) ago and occurs 7 days per week and is experienced as moderate.  A) Single episode - symptoms have been present during the same 2-week period and represent a change from previous functioning 5 or more symptoms (required for diagnosis)   - Depressed mood. Note: In children and adolescents, can be irritable mood.     - Diminished interest or pleasure in all, or almost all, activities.    - Significant weight loss when not dieting decrease in appetite.    - Fatigue or loss of energy.    - Diminished ability to think or concentrate, or indecisiveness.   B) The symptoms cause clinically significant distress or impairment in social, occupational, or other important areas of functioning  C) The episode is not attributable to the physiological effects of a substance or to another medical condition  D) The occurence of major depressive episode is not better explained by other thought / psychotic disorders  E) There has never been a manic episode or hypomanic episode      DSM5 Diagnoses: (Sustained by DSM5 Criteria Listed Above)  Diagnoses: 296.22 (F32.1)  Major Depressive Disorder, Single Episode, Moderate With anxious distress  300.02 (F41.1) Generalized Anxiety Disorder  Psychosocial & Contextual Factors: Psychosocial stressors   WHODAS 2.0 (12 item):   WHODAS 2.0 Total Score  1/14/2021   Total Score 17     Intervention:   MI: Asked open ended questions to explore the patient's readiness and motivation for therapy  Collateral Reports Completed:  Routed note to PCP      PLAN: (Homework, other):  1. Provider will continue Diagnostic Assessment.  Patient was given the following to do until next session:  Consider specific treatment goals for the treatment plan.    2. Provider recommended the following referrals: None.      3.  Safety plan not needed at this time.     DASIA HAHN, DONNY  January 14, 2021              Again, thank you for allowing me to participate in the care of your patient.        Sincerely,        Dasia Lima

## 2021-01-14 NOTE — PROGRESS NOTES
Progress Note - Initial Visit    Client Name:  Johnna Julienuemonisa Date: 2021         Service Type: Individual     Visit Start Time: 12:06pm  Visit End Time: 12:56pm    Visit #: 1    Attendees: Client attended alone    Service Modality:  Video Visit:      Provider verified identity through the following two step process.  Patient provided:  Patient photo and Patient     Telemedicine Visit: The patient's condition can be safely assessed and treated via synchronous audio and visual telemedicine encounter.      Reason for Telemedicine Visit: Services only offered telehealth    Originating Site (Patient Location): Patient's home    Distant Site (Provider Location): Provider Remote Setting    Consent:  The patient/guardian has verbally consented to: the potential risks and benefits of telemedicine (video visit) versus in person care; bill my insurance or make self-payment for services provided; and responsibility for payment of non-covered services.     Patient would like the video invitation sent by:  Send to e-mail at: [email or web address]    Mode of Communication:  Video Conference via [employer]well    As the provider I attest to compliance with applicable laws and regulations related to telemedicine.       DATA:   Interactive Complexity: No   Crisis: No     Presenting Concerns/  Current Stressors:  Patient reported that she is currently seeking therapy services to aid with coping with current psychosocial stressors. 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 The patient also reported that there is some current conflict in her native home of [place] and she is fearful for her family still living there.     The patient is currently living with her  and children.     ASSESSMENT:  Mental Status Assessment:  Appearance:   Appropriate   Eye Contact:   Good   Psychomotor Behavior: Normal   Attitude:   Cooperative    Orientation:   All  Speech   Rate / Production: Normal/ Responsive   Volume:  Normal   Mood:    Anxious   Affect:    Worrisome   Thought Content:  Clear   Thought Form:  Coherent   Insight:    Fair       Safety Issues and Plan for Safety and Risk Management:     [place] Suicide Severity Rating Scale (Lifetime/Recent)  [place] Suicide Severity Rating (Lifetime/Recent) 1/14/2021   1. Wish to be Dead (Lifetime) No   1. Wish to be Dead (Recent) No   2. Non-Specific Active Suicidal Thoughts (Lifetime) No   2. Non-Specific Active Suicidal Thoughts (Recent) No   Actual Attempt (Lifetime) No   Actual Attempt (Past 3 Months) No   Has subject engaged in non-suicidal self-injurious behavior? (Lifetime) No   Has subject engaged in non-suicidal self-injurious behavior? (Past 3 Months) No   Interrupted Attempts (Lifetime) No   Interrupted Attempts (Past 3 Months) No     Patient denies current fears or concerns for personal safety.  Patient denies current or recent suicidal ideation or behaviors.  Patient denies current or recent homicidal ideation or behaviors.  Patient denies current or recent self injurious behavior or ideation.  Patient denies other safety concerns.  Recommended that patient call 911 or go to the local ED should there be a change in any of these risk factors.  Patient reports there are no firearms in the house.     Diagnostic Criteria:  A. Excessive anxiety and worry about a number of events or activities (such as work or school performance).   B. The person finds it difficult to control the worry.  C. Select 3 or more symptoms (required for diagnosis). Only one item is required in children.   - Restlessness or feeling keyed up or on edge.    - Difficulty concentrating or mind going blank.    - Irritability.   D. The focus of the anxiety and worry is not confined to features of an Axis I disorder.  E. The anxiety, worry, or physical symptoms cause clinically significant distress or impairment in social,  occupational, or other important areas of functioning.   F. The disturbance is not due to the direct physiological effects of a substance (e.g., a drug of abuse, a medication) or a general medical condition (e.g., hyperthyroidism) and does not occur exclusively during a Mood Disorder, a Psychotic Disorder, or a Pervasive Developmental Disorder.    - The aformentioned symptoms began 2 year(s) ago and occurs 7 days per week and is experienced as moderate.  A) Single episode - symptoms have been present during the same 2-week period and represent a change from previous functioning 5 or more symptoms (required for diagnosis)   - Depressed mood. Note: In children and adolescents, can be irritable mood.     - Diminished interest or pleasure in all, or almost all, activities.    - Significant weight loss when not dieting decrease in appetite.    - Fatigue or loss of energy.    - Diminished ability to think or concentrate, or indecisiveness.   B) The symptoms cause clinically significant distress or impairment in social, occupational, or other important areas of functioning  C) The episode is not attributable to the physiological effects of a substance or to another medical condition  D) The occurence of major depressive episode is not better explained by other thought / psychotic disorders  E) There has never been a manic episode or hypomanic episode      DSM5 Diagnoses: (Sustained by DSM5 Criteria Listed Above)  Diagnoses: 296.22 (F32.1)  Major Depressive Disorder, Single Episode, Moderate With anxious distress  300.02 (F41.1) Generalized Anxiety Disorder  Psychosocial & Contextual Factors: Psychosocial stressors   WHODAS 2.0 (12 item):   WHODAS 2.0 Total Score 1/14/2021   Total Score 17     Intervention:   MI: Asked open ended questions to explore the patient's readiness and motivation for therapy  Collateral Reports Completed:  Routed note to PCP      PLAN: (Homework, other):  1. Provider will continue Diagnostic  Assessment.  Patient was given the following to do until next session:  Consider specific treatment goals for the treatment plan.    2. Provider recommended the following referrals: None.      3.  Safety plan not needed at this time.     AMARI HAHN, DONNY  January 14, 2021  Service Performed and Documented by DONNY-   Note reviewed and clinical supervision by SELMA Martinez [facility] 1/31/2021

## 2021-01-15 ASSESSMENT — ANXIETY QUESTIONNAIRES: GAD7 TOTAL SCORE: 15

## 2021-04-25 ENCOUNTER — HEALTH MAINTENANCE LETTER (OUTPATIENT)
Age: 36
End: 2021-04-25

## 2021-06-15 ENCOUNTER — OFFICE VISIT (OUTPATIENT)
Dept: FAMILY MEDICINE | Facility: CLINIC | Age: 36
End: 2021-06-15
Payer: COMMERCIAL

## 2021-06-15 VITALS
HEART RATE: 85 BPM | BODY MASS INDEX: 33.99 KG/M2 | WEIGHT: 209 LBS | DIASTOLIC BLOOD PRESSURE: 80 MMHG | SYSTOLIC BLOOD PRESSURE: 100 MMHG | OXYGEN SATURATION: 100 % | TEMPERATURE: 97.9 F

## 2021-06-15 DIAGNOSIS — Z00.00 ROUTINE GENERAL MEDICAL EXAMINATION AT A HEALTH CARE FACILITY: Primary | ICD-10-CM

## 2021-06-15 DIAGNOSIS — F41.8 SITUATIONAL ANXIETY: ICD-10-CM

## 2021-06-15 DIAGNOSIS — Z23 ENCOUNTER FOR IMMUNIZATION: ICD-10-CM

## 2021-06-15 PROCEDURE — 90471 IMMUNIZATION ADMIN: CPT | Performed by: NURSE PRACTITIONER

## 2021-06-15 PROCEDURE — 99395 PREV VISIT EST AGE 18-39: CPT | Mod: 25 | Performed by: NURSE PRACTITIONER

## 2021-06-15 PROCEDURE — 99213 OFFICE O/P EST LOW 20 MIN: CPT | Mod: 25 | Performed by: NURSE PRACTITIONER

## 2021-06-15 PROCEDURE — 90746 HEPB VACCINE 3 DOSE ADULT IM: CPT | Performed by: NURSE PRACTITIONER

## 2021-06-15 RX ORDER — ISONIAZID 300 MG/1
300 TABLET ORAL DAILY
Qty: 90 TABLET | Refills: 0 | Status: CANCELLED | OUTPATIENT
Start: 2021-06-15 | End: 2021-09-13

## 2021-06-15 RX ORDER — RIFAMPIN 300 MG/1
600 CAPSULE ORAL DAILY
Qty: 180 CAPSULE | Refills: 0 | Status: CANCELLED | OUTPATIENT
Start: 2021-06-15

## 2021-06-15 RX ORDER — PROPRANOLOL HYDROCHLORIDE 10 MG/1
10 TABLET ORAL 3 TIMES DAILY
Qty: 30 TABLET | Refills: 1 | Status: SHIPPED | OUTPATIENT
Start: 2021-06-15 | End: 2024-01-16

## 2021-06-15 NOTE — LETTER
Sejal 15, 2021      Johnna Maher  [address] [place] [address]        To Whom It May Concern:    Johnna Maher  was seen on 6/15/2021.  See has a diagnosis of situational Anxiety, and may require accommodations for test taking.      Sincerely,        IKE Watts CNP

## 2021-06-15 NOTE — PATIENT INSTRUCTIONS
"  Preventive Health Recommendations  Female Ages 26 - 39  Yearly exam:   See your health care provider every year in order to    Review health changes.     Discuss preventive care.      Review your medicines if you your doctor has prescribed any.    Until age 30: Get a Pap test every three years (more often if you have had an abnormal result).    After age 30: Talk to your doctor about whether you should have a Pap test every 3 years or have a Pap test with HPV screening every 5 years.   You do not need a Pap test if your uterus was removed (hysterectomy) and you have not had cancer.  You should be tested each year for STDs (sexually transmitted diseases), if you're at risk.   Talk to your provider about how often to have your cholesterol checked.  If you are at risk for diabetes, you should have a diabetes test (fasting glucose).  Shots: Get a flu shot each year. Get a tetanus shot every 10 years.   Nutrition:     Eat at least 5 servings of fruits and vegetables each day.    Eat whole-grain bread, whole-wheat pasta and brown rice instead of white grains and rice.    Get adequate Calcium and Vitamin D.     Lifestyle    Exercise at least 150 minutes a week (30 minutes a day, 5 days of the week). This will help you control your weight and prevent disease.    Limit alcohol to one drink per day.    No smoking.     Wear sunscreen to prevent skin cancer.    See your dentist every six months for an exam and cleaning.    With studying: try to check in with family first and set aside some time to do this before setting aside time to study; think about putting phone away and putting headphones on when you are studying. Keep a piece of paper to write things down if worries about family/planning come up  -try to do a few minutes a few times per day of deep breathing; empty your mind and repeat a positive mantra like \"I will figure it out,\" or \"my family will be okay.\" try to keep your mind open and empty while you are doing " this, and you can set a time to do it, or listen to a song while you do it.

## 2021-06-15 NOTE — PROGRESS NOTES
SUBJECTIVE:   CC: Johnna Maher is an 35 year old woman who presents for preventive health visit.     Patient has been advised of split billing requirements and indicates understanding: Yes  Healthy Habits:    Do you get at least three servings of calcium containing foods daily (dairy, green leafy vegetables, etc.)? yes    Amount of exercise or daily activities, outside of work: 3 day(s) per week    Problems taking medications regularly No    Medication side effects: No    Have you had an eye exam in the past two years? no    Do you see a dentist twice per year? 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  Anxiety Follow-Up    How are you doing with your anxiety since your last visit? Worsened -situational with school work; worrying about family members abroad    Are you having other symptoms that might be associated with anxiety? No    Have you had a significant life event? No     Are you feeling depressed? No    Do you have any concerns with your use of alcohol or other drugs? No    Social History     Tobacco Use     Smoking status: Never Smoker     Smokeless tobacco: Never Used   Substance Use Topics     Alcohol use: No     Frequency: Never     Drug use: No     NIXON-7 SCORE 12/12/2017 5/9/2018 1/14/2021   Total Score 1 0 15     PHQ 12/12/2017 5/9/2018 1/14/2021   PHQ-9 Total Score 3 0 10   Q9: Thoughts of better off dead/self-harm past 2 weeks Not at all Not at all Not at all     NIXON-7  1/14/2021   1. Feeling nervous, anxious, or on edge 3   2. Not being able to stop or control worrying 3   3. Worrying too much about different things 3   4. Trouble relaxing 0   5. Being so restless that it is hard to sit still 0   6. Becoming easily annoyed or irritable 3   7. Feeling afraid, as if something awful might happen 3   NIXON-7 Total Score 15   If you checked any problems, how difficult have they made it  for you to do your work, take care of things at home, or get along with other people? -             Today's PHQ-2 Score:   PHQ-2 ( 1999 Pfizer) 11/17/2020 6/20/2019   Q1: Little interest or pleasure in doing things 0 0   Q2: Feeling down, depressed or hopeless 0 0   PHQ-2 Score 0 0       Abuse: Current or Past(Physical, Sexual or Emotional)- No  Do you feel safe in your environment? Yes    Have you ever done Advance Care Planning? (For example, a Health Directive, POLST, or a discussion with a medical provider or your loved ones about your wishes): No, advance care planning information given to patient to review.  Patient declined advance care planning discussion at this time.    Social History     Tobacco Use     Smoking status: Never Smoker     Smokeless tobacco: Never Used   Substance Use Topics     Alcohol use: No     Frequency: Never     If you drink alcohol do you typically have >3 drinks per day or >7 drinks per week? No                     Reviewed orders with patient.  Reviewed health maintenance and updated orders accordingly - Yes  Lab work is in process    FSH-7: No flowsheet data found.  click delete button to remove this line now    Pertinent mammograms are reviewed under the imaging tab.    Pertinent mammograms are reviewed under the imaging tab.  History of abnormal Pap smear: NO - age 30- 65 PAP every 3 years recommended  PAP / HPV Latest Ref Rng & Units 5/9/2018   PAP - NIL   HPV 16 DNA NEG:Negative Negative   HPV 18 DNA NEG:Negative Negative   OTHER HR HPV NEG:Negative Negative     Reviewed and updated as needed this visit by clinical staff                 Reviewed and updated as needed this visit by Provider                    ROS:  CONSTITUTIONAL: NEGATIVE for fever, chills, change in weight  INTEGUMENTARU/SKIN: NEGATIVE for worrisome rashes, moles or lesions  EYES: NEGATIVE for vision changes or irritation  ENT: NEGATIVE for ear, mouth and throat problems  RESP: NEGATIVE for significant cough  or SOB  BREAST: NEGATIVE for masses, tenderness or discharge  CV: NEGATIVE for chest pain, palpitations or peripheral edema  GI: NEGATIVE for nausea, abdominal pain, heartburn, or change in bowel habits  : NEGATIVE for unusual urinary or vaginal symptoms. Periods are regular.  MUSCULOSKELETAL: NEGATIVE for significant arthralgias or myalgia  NEURO: NEGATIVE for weakness, dizziness or paresthesias  PSYCHIATRIC: NEGATIVE for changes in mood or affect    OBJECTIVE:   There were no vitals taken for this visit.  EXAM:  GENERAL: healthy, alert and no distress  HENT: ear canals and TM's normal, nose and mouth without ulcers or lesions  NECK: no adenopathy, no asymmetry, masses, or scars and thyroid normal to palpation  RESP: lungs clear to auscultation - no rales, rhonchi or wheezes  CV: regular rate and rhythm, normal S1 S2, no S3 or S4, no murmur, click or rub, no peripheral edema and peripheral pulses strong  ABDOMEN: soft, nontender, no hepatosplenomegaly, no masses and bowel sounds normal  MS: no gross musculoskeletal defects noted, no edema  SKIN: no suspicious lesions or rashes    Diagnostic Test Results:  Labs reviewed in Epic  No results found for this or any previous visit (from the past 24 hour(s)).    ASSESSMENT/PLAN:       ICD-10-CM    1. Routine general medical examination at a health care facility  Z00.00    2. Encounter for immunization  Z23 HEPATITIS B VACCINE, ADULT, IM   3. Situational anxiety  F41.8 propranolol (INDERAL) 10 MG tablet   -continue counseling; follow up in 3-6 month as needed for situational anxiety  -patient will follow up for pap in the summer and IUD replacement    Patient has been advised of split billing requirements and indicates understanding: Yes  COUNSELING:   Reviewed preventive health counseling, as reflected in patient instructions       Regular exercise       Healthy diet/nutrition    Estimated body mass index is 36 kg/m  as calculated from the following:    Height as of  "11/30/20: 1.67 m (5' 5.75\").    Weight as of 11/30/20: 100.4 kg (221 lb 5 oz).        She reports that she has never smoked. She has never used smokeless tobacco.      Counseling Resources:  ATP IV Guidelines  Pooled Cohorts Equation Calculator  Breast Cancer Risk Calculator  BRCA-Related Cancer Risk Assessment: FHS-7 Tool  FRAX Risk Assessment  ICSI Preventive Guidelines  Dietary Guidelines for Americans, 2010  USDA's MyPlate  ASA Prophylaxis  Lung CA Screening    IKE Watts CNP  [facility]  "

## 2021-10-09 ENCOUNTER — HEALTH MAINTENANCE LETTER (OUTPATIENT)
Age: 36
End: 2021-10-09

## 2021-12-15 ENCOUNTER — APPOINTMENT (OUTPATIENT)
Dept: URGENT CARE | Facility: CLINIC | Age: 36
End: 2021-12-15
Payer: COMMERCIAL

## 2021-12-24 ENCOUNTER — ALLIED HEALTH/NURSE VISIT (OUTPATIENT)
Dept: FAMILY MEDICINE | Facility: CLINIC | Age: 36
End: 2021-12-24
Payer: COMMERCIAL

## 2021-12-24 DIAGNOSIS — Z11.1 VISIT FOR MANTOUX TEST: Primary | ICD-10-CM

## 2021-12-24 PROCEDURE — 86580 TB INTRADERMAL TEST: CPT

## 2021-12-24 PROCEDURE — 99207 PR NO CHARGE NURSE ONLY: CPT

## 2021-12-24 NOTE — PROGRESS NOTES
Patient is here today for a Mantoux (TST) test placement.    Is there a current order in the chart? Yes    Reason for Mantoux (TST) in patient's own words: needs for school    Patient needs form signed? Yes- but bringing on Monday 12/27/2021 when comes for reading     Instructed patient to wait for 15 minutes post injection and to report any reactions immediately to staff.    Told patient to return to clinic in 48-72 hours to have Mantoux (TST) read.     Cindy Tim MA

## 2021-12-27 ENCOUNTER — ALLIED HEALTH/NURSE VISIT (OUTPATIENT)
Dept: FAMILY MEDICINE | Facility: CLINIC | Age: 36
End: 2021-12-27
Payer: COMMERCIAL

## 2021-12-27 ENCOUNTER — OFFICE VISIT (OUTPATIENT)
Dept: FAMILY MEDICINE | Facility: CLINIC | Age: 36
End: 2021-12-27
Payer: COMMERCIAL

## 2021-12-27 DIAGNOSIS — Z11.1 SCREENING EXAMINATION FOR PULMONARY TUBERCULOSIS: Primary | ICD-10-CM

## 2021-12-27 DIAGNOSIS — Z22.7 LATENT TUBERCULOSIS BY BLOOD TEST: Primary | ICD-10-CM

## 2021-12-27 DIAGNOSIS — Z97.5 IUD (INTRAUTERINE DEVICE) IN PLACE: ICD-10-CM

## 2021-12-27 DIAGNOSIS — Z23 HIGH PRIORITY FOR 2019-NCOV VACCINE: ICD-10-CM

## 2021-12-27 LAB
PPDINDURATION: ABNORMAL MM (ref 0–4.99)
PPDREDNESS: PRESENT

## 2021-12-27 PROCEDURE — 91300 COVID-19,PF,PFIZER (12+ YRS): CPT | Performed by: NURSE PRACTITIONER

## 2021-12-27 PROCEDURE — 99213 OFFICE O/P EST LOW 20 MIN: CPT | Mod: 25 | Performed by: NURSE PRACTITIONER

## 2021-12-27 PROCEDURE — 0004A COVID-19,PF,PFIZER (12+ YRS): CPT | Performed by: NURSE PRACTITIONER

## 2021-12-27 PROCEDURE — 99207 PR NO CHARGE NURSE ONLY: CPT

## 2021-12-27 RX ORDER — ISONIAZID 300 MG/1
300 TABLET ORAL DAILY
Qty: 90 TABLET | Refills: 1 | Status: SHIPPED | OUTPATIENT
Start: 2021-12-27 | End: 2024-01-17

## 2021-12-27 NOTE — PATIENT INSTRUCTIONS
Patient Education     Medicine for Tuberculosis  Tuberculosis (TB) can scar the lungs and other parts of the body, such as the kidneys, bones, or the brain. TB can even be fatal. If you know you have TB, get medical treatment. This includes taking 1 or more antibiotics for as many as 3 to 12 months or more. It is vital that you follow your treatment plan as directed.   Important  Take all of your medicine as prescribed. If you don t, the TB may not go away and you may still be infected.   Treatment with medicine  Inactive TB (latent TB infection) and active TB disease are treated with antibiotics. You may have tests to tell which medicines are right for you. Latent TB is treated with 1 or 2 antibiotics at a time. Active TB disease is treated with 2 to 4 antibiotics at a time. Treatment takes 3 to12 months or more, depending on your condition. Many people feel better after taking their medicines for only a few weeks. But it's very important to keep taking them exactly as you are told. This should cure the disease. If you don t take all your medicine, your symptoms may come back.     Follow your treatment plan  Follow the instructions on your prescription. Follow any instructions your healthcare provider gives you. This is the only way to make your TB go away. Take all of your medicines, even if you are not having any symptoms. If you don t take the medicines as prescribed you can become sick again. And you can perhaps spread the disease to other people. If you don't take the medicines correctly, the germs that are still alive can become resistant to the medicines.   Go for follow-up  If you have latent TB infection or active TB disease, go for follow-up exams. Exams help to make sure any medicine you re given is working and that you're not having severe side effects. The only way to cure TB is to take all of your medicine as instructed.   For more information  For more information on TB, call your local health  department. Also call the American Lung Association at [phone], or visit them online.   [employer] last reviewed this educational content on 6/1/2019 2000-2021 [employer]. All rights reserved. This information is not intended as a substitute for professional medical care. Always follow your healthcare professional's instructions.

## 2021-12-27 NOTE — PROGRESS NOTES
Patient is here today for a Mantoux (TST) test results.    Did patient return to clinic 48-72 hours from Mantoux (TST) placement:   Yes -     PPD Induration   Date Value Ref Range Status   12/27/2021 17mm (A) 0 - 4.99 mm Final     Comment:     Positive     PPD Redness   Date Value Ref Range Status   12/27/2021 Present  Final     Comment:     Positive           Induration Size? Induration >5mm - Give patient a mask to wear and follow airborne infectious disease control plan. Enter results in Enter/Edit Activity. Route results to ordering provider. Provider notified:  Provider will speak with pt and order any necessary f/u    Patient needs form signed? Yes. Follow clinic form process.     Patient reports having previously had the BCG Vaccine: No    Does patient need a two step? No     Isabella Osorio RN  [facility]

## 2021-12-27 NOTE — PROGRESS NOTES
Assessment & Plan       ICD-10-CM    1. Latent tuberculosis by blood test  Z22.7 isoniazid (NYDRAZID) 300 MG tablet     XR Chest 2 Views     Comprehensive metabolic panel (BMP + Alb, Alk Phos, ALT, AST, Total. Bili, TP)   2. 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 She is not having any symptoms and starting treatment now    Lab work in 1 month lab only is okay, follow up if any concerns or cannot tolerate med    Return in about 4 weeks (around 1/24/2022) for Lab Work in 1 month, with me for next annual or earlier as needed.    IKE Torre CNP  [facility]    Kylie Oropeza is a 36 year old who presents for the following health issues     HPI   Positive mantoux, RN converted this into Provider visit  No Tb symptoms   Had BCG vaccine as a child  Pt reports Treated 2019 in [facility] , no records in Care everywhere for me to view   She thinks she started med first in Nov 2019   then again in Nov 2020, started INH and B6, had negative CXR 11/25/2020  but never took it longer than a month, admits she fogot to get it refilled   Tolerated med both times, minimal alcohol and tylenol use no herbal things and very healthy person overall , verified is not pregnant has IUD  Nursing school   Titers negative for Rubella, did get second MMR shot afterward  Due for COVID booster        Review of Systems   Constitutional, HEENT, cardiovascular, pulmonary, GI, , musculoskeletal, neuro, skin, endocrine and psych systems are negative, except as otherwise noted.      Objective    There were no vitals taken for this visit.  There is no height or weight on file to calculate BMI.  Physical Exam   GENERAL: healthy, alert and no distress  EYES: Eyes grossly  normal to inspection, PERRL and conjunctivae and sclerae normal  NECK: no adenopathy, no asymmetry, masses, or scars and thyroid normal to palpation  RESP: lungs clear to auscultation - no rales, rhonchi or wheezes  CV: regular rate and rhythm, normal S1 S2, no S3 or S4, no murmur, click or rub, no peripheral edema and peripheral pulses strong  ABDOMEN: soft, nontender, no hepatosplenomegaly, no masses and bowel sounds normal  MS: no gross musculoskeletal defects noted, no edema

## 2021-12-27 NOTE — LETTER
[facility]  [address]  [address]  [facility] [address]  Phone: [phone]  Fax: [phone]    December 27, 2021        Johnna Maher  [address]  [place] [address]          To whom it may concern:    RE: Johnna Maher    Patient was seen and treated today at our clinic.  She had the BCG vaccine as a child. Therefore, please note she should never have skin mantoux tests because they will always be positive. Her chest Xray was negative 11/25/2020 and she has no symptoms. 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     Please contact me for questions or concerns.      Sincerely,        IKE Torre CNP

## 2021-12-27 NOTE — LETTER
[facility]  [address]  [address]  [facility] [address]  Phone: [phone]  Fax: [phone]    December 27, 2021        Johnna Maher  [address]  [place] [address]          To whom it may concern:    RE: Johnna Maher    Patient was seen and treated today at our clinic.     She has Latent Tb and had the BCG vaccine as a child. She should never have mantoux tests as they will always be positive. Her CXR was negative and she will undergo treatment for the Latent Tb. Her Chest Xrays have been negative/ normal, and we will repeat this today.         Please contact me for questions or concerns.      Sincerely,        IKE Torre CNP

## 2022-07-16 ENCOUNTER — HEALTH MAINTENANCE LETTER (OUTPATIENT)
Age: 37
End: 2022-07-16

## 2022-08-01 ENCOUNTER — OFFICE VISIT (OUTPATIENT)
Dept: FAMILY MEDICINE | Facility: CLINIC | Age: 37
End: 2022-08-01
Payer: COMMERCIAL

## 2022-08-01 VITALS
TEMPERATURE: 98.9 F | WEIGHT: 205.8 LBS | RESPIRATION RATE: 20 BRPM | BODY MASS INDEX: 34.29 KG/M2 | OXYGEN SATURATION: 100 % | HEIGHT: 65 IN | HEART RATE: 89 BPM | DIASTOLIC BLOOD PRESSURE: 78 MMHG | SYSTOLIC BLOOD PRESSURE: 128 MMHG

## 2022-08-01 DIAGNOSIS — Z30.430 ENCOUNTER FOR INSERTION OF INTRAUTERINE CONTRACEPTIVE DEVICE: Primary | ICD-10-CM

## 2022-08-01 DIAGNOSIS — Z30.432 ENCOUNTER FOR REMOVAL OF INTRAUTERINE CONTRACEPTIVE DEVICE (IUD): ICD-10-CM

## 2022-08-01 DIAGNOSIS — Z12.4 SCREENING FOR MALIGNANT NEOPLASM OF CERVIX: ICD-10-CM

## 2022-08-01 PROCEDURE — 87624 HPV HI-RISK TYP POOLED RSLT: CPT | Performed by: FAMILY MEDICINE

## 2022-08-01 PROCEDURE — 88142 CYTOPATH C/V THIN LAYER: CPT | Performed by: FAMILY MEDICINE

## 2022-08-01 PROCEDURE — 58301 REMOVE INTRAUTERINE DEVICE: CPT | Performed by: FAMILY MEDICINE

## 2022-08-01 ASSESSMENT — PATIENT HEALTH QUESTIONNAIRE - PHQ9
SUM OF ALL RESPONSES TO PHQ QUESTIONS 1-9: 0
10. IF YOU CHECKED OFF ANY PROBLEMS, HOW DIFFICULT HAVE THESE PROBLEMS MADE IT FOR YOU TO DO YOUR WORK, TAKE CARE OF THINGS AT HOME, OR GET ALONG WITH OTHER PEOPLE: NOT DIFFICULT AT ALL
SUM OF ALL RESPONSES TO PHQ QUESTIONS 1-9: 0

## 2022-08-01 ASSESSMENT — PAIN SCALES - GENERAL: PAINLEVEL: NO PAIN (0)

## 2022-08-01 NOTE — PROGRESS NOTES
"      Subjective   Johnna is a 37 year old, presenting for the following health issues:  IUD      HPI   IUD replaced and update pap smear.            Review of Systems         Objective    /78   Pulse 89   Temp 98.9  F (37.2  C) (Oral)   Resp 20   Ht 1.651 m (5' 5\")   Wt 93.4 kg (205 lb 12.8 oz)   LMP 07/25/2022 (Approximate)   SpO2 100%   BMI 34.25 kg/m    Body mass index is 34.25 kg/m .  Physical Exam                    .  ..  Answers for HPI/ROS submitted by the patient on 8/1/2022  If you checked off any problems, how difficult have these problems made it for you to do your work, take care of things at home, or get along with other people?: Not difficult at all  PHQ9 TOTAL SCORE: 0    --------------------------------------------------------------------------------------------------------------------------------------  SUBJECTIVE:  Johnna Maher is a 37 year old female who presents for IUD removal insertion.  The patient had questions about using the Mirena versus the Kyleena.  She reports that she is unsure what IUD she has it was placed in while she was in [facility].  She reports that she does have heavy and painful periods.       OBJECTIVE/PROCEDURE NOTE  On exam, this is a well-developed, well-nourished female.  Weight is 205 lbs 12.8 oz.  Height is   Ht Readings from Last 2 Encounters:   08/01/22 1.651 m (5' 5\")   11/30/20 1.67 m (5' 5.75\")     The procedure was chaperoned by a medical assistant.  The patient was places in the dorsal lithotomy position.   A nonsterile speculum was placed and the cervix was visualized.  The IUD strings were visualized and grasped with a ring forceps.  The IUD was removed easily with minimal bleeding.  Pap smear was then obtained.    A sterile speculum was inserted. The cervix and exposed vaginal walls were painted with Betadine. The anterior lip of the cervix was grasped with a toothed tenaculum as the patient coughed. The uterus was attempted to be " sounded but I could not get past 4 cm.     At that point I discussed with the patient the difficulty and that the risk of continuing is not justified.  The tenaculum was removed and the blood was removed with a large cotton applicator.       ASSESSMENT and PLAN:  Undesired fertility in an adult female.  IUD removed.  Unuccessful placement of a new IUD.   She was referred to gynecology.

## 2022-08-03 LAB
BKR LAB AP GYN ADEQUACY: NORMAL
BKR LAB AP GYN INTERPRETATION: NORMAL
BKR LAB AP HPV REFLEX: NORMAL
BKR LAB AP LMP: NORMAL
BKR LAB AP PREVIOUS ABNORMAL: NORMAL
PATH REPORT.COMMENTS IMP SPEC: NORMAL
PATH REPORT.COMMENTS IMP SPEC: NORMAL
PATH REPORT.RELEVANT HX SPEC: NORMAL

## 2022-08-04 LAB
HUMAN PAPILLOMA VIRUS 16 DNA: NEGATIVE
HUMAN PAPILLOMA VIRUS 18 DNA: NEGATIVE
HUMAN PAPILLOMA VIRUS FINAL DIAGNOSIS: NORMAL
HUMAN PAPILLOMA VIRUS OTHER HR: NEGATIVE

## 2022-09-12 NOTE — PROGRESS NOTES
1. NIXON (generalized anxiety disorder)  Close follow-up in 1 month.  Letter written for test accommodations.   - amitriptyline (ELAVIL) 25 MG tablet; Take 1 tablet (25 mg) by mouth At Bedtime  Dispense: 60 tablet; Refill: 0    2. Need for influenza vaccination  - INFLUENZA VACCINE IM > 6 MONTHS VALENT IIV4 (AFLURIA/FLUZONE)      Kylie Oropeza is a 37 year old, presenting for the following health issues:  Anxiety      History of Present Illness       Mental Health Follow-up:  Patient presents to follow-up on Anxiety.    Patient's anxiety since last visit has been:  Worse  The patient is having other symptoms associated with anxiety.  Any significant life events: relationship concerns  Patient is not feeling anxious or having panic attacks.  Patient has no concerns about alcohol or drug use.    She eats 2-3 servings of fruits and vegetables daily.She consumes 1 sweetened beverage(s) daily.She exercises with enough effort to increase her heart rate 9 or less minutes per day.  She exercises with enough effort to increase her heart rate 3 or less days per week.   She is taking medications regularly.    Today's PHQ-9         PHQ-9 Total Score: 15    PHQ-9 Q9 Thoughts of better off dead/self-harm past 2 weeks :   Not at all    How difficult have these problems made it for you to do your work, take care of things at home, or get along with other people: Somewhat difficult  Today's NIXON-7 Score: 14       1. Anxiety: Started last year.  Gets anxious taking exam and fails her test.  Patient had a letter exempting for more time and separate space.  Patient does not sleep well at night.  Not getting enough sleep.  Hard to focus.  Patient is requesting accommodations.  Patient has a hard time maintaining sleep.  Has not tried any medications.  Stressed with school and kids.  Constantly worry about children.      Review of Systems   Constitutional: Negative for chills and fever.   HENT: Negative for congestion, ear pain,  hearing loss and sore throat.    Eyes: Negative for pain and visual disturbance.   Respiratory: Negative for cough and shortness of breath.    Cardiovascular: Negative for chest pain, palpitations and peripheral edema.   Gastrointestinal: Negative for abdominal pain, constipation, diarrhea, heartburn, hematochezia and nausea.   Breasts:  Negative for tenderness, breast mass and discharge.   Genitourinary: Negative for dysuria, frequency, genital sores, hematuria, pelvic pain, urgency, vaginal bleeding and vaginal discharge.   Musculoskeletal: Negative for arthralgias, joint swelling and myalgias.   Skin: Negative for rash.   Neurological: Negative for dizziness, weakness, headaches and paresthesias.   Psychiatric/Behavioral: Positive for sleep disturbance. Negative for mood changes. The patient is nervous/anxious.             Objective    /87 (BP Location: Left arm, Patient Position: Sitting, Cuff Size: Adult Regular)   Pulse 81   Temp 98.3  F (36.8  C) (Tympanic)   Resp 18   Wt 92.7 kg (204 lb 6.4 oz)   LMP 08/30/2022 (Approximate)   SpO2 100%   Breastfeeding No   BMI 34.01 kg/m    Body mass index is 34.01 kg/m .  Physical Exam  Constitutional:       General: She is not in acute distress.     Appearance: She is well-developed.   HENT:      Head: Normocephalic and atraumatic.      Nose: Nose normal.   Eyes:      Conjunctiva/sclera: Conjunctivae normal.   Neck:      Trachea: No tracheal deviation.   Cardiovascular:      Rate and Rhythm: Normal rate and regular rhythm.      Heart sounds: Normal heart sounds.   Pulmonary:      Effort: Pulmonary effort is normal. No respiratory distress.      Breath sounds: Normal breath sounds.   Musculoskeletal:         General: Normal range of motion.      Cervical back: Normal range of motion.   Skin:     General: Skin is warm.   Neurological:      Mental Status: She is alert and oriented to person, place, and time.   Psychiatric:         Behavior: Behavior normal.

## 2022-09-13 ENCOUNTER — OFFICE VISIT (OUTPATIENT)
Dept: FAMILY MEDICINE | Facility: CLINIC | Age: 37
End: 2022-09-13
Payer: COMMERCIAL

## 2022-09-13 VITALS
SYSTOLIC BLOOD PRESSURE: 131 MMHG | TEMPERATURE: 98.3 F | WEIGHT: 204.4 LBS | DIASTOLIC BLOOD PRESSURE: 87 MMHG | RESPIRATION RATE: 18 BRPM | BODY MASS INDEX: 34.01 KG/M2 | HEART RATE: 81 BPM | OXYGEN SATURATION: 100 %

## 2022-09-13 DIAGNOSIS — F41.1 GAD (GENERALIZED ANXIETY DISORDER): Primary | ICD-10-CM

## 2022-09-13 DIAGNOSIS — Z23 NEED FOR INFLUENZA VACCINATION: ICD-10-CM

## 2022-09-13 PROCEDURE — 90686 IIV4 VACC NO PRSV 0.5 ML IM: CPT | Performed by: FAMILY MEDICINE

## 2022-09-13 PROCEDURE — 96127 BRIEF EMOTIONAL/BEHAV ASSMT: CPT | Performed by: FAMILY MEDICINE

## 2022-09-13 PROCEDURE — 99214 OFFICE O/P EST MOD 30 MIN: CPT | Mod: 25 | Performed by: FAMILY MEDICINE

## 2022-09-13 PROCEDURE — 90471 IMMUNIZATION ADMIN: CPT | Performed by: FAMILY MEDICINE

## 2022-09-13 ASSESSMENT — ANXIETY QUESTIONNAIRES
2. NOT BEING ABLE TO STOP OR CONTROL WORRYING: MORE THAN HALF THE DAYS
GAD7 TOTAL SCORE: 14
4. TROUBLE RELAXING: MORE THAN HALF THE DAYS
7. FEELING AFRAID AS IF SOMETHING AWFUL MIGHT HAPPEN: NEARLY EVERY DAY
1. FEELING NERVOUS, ANXIOUS, OR ON EDGE: MORE THAN HALF THE DAYS
3. WORRYING TOO MUCH ABOUT DIFFERENT THINGS: MORE THAN HALF THE DAYS
GAD7 TOTAL SCORE: 14
GAD7 TOTAL SCORE: 14
7. FEELING AFRAID AS IF SOMETHING AWFUL MIGHT HAPPEN: NEARLY EVERY DAY
5. BEING SO RESTLESS THAT IT IS HARD TO SIT STILL: NOT AT ALL
6. BECOMING EASILY ANNOYED OR IRRITABLE: NEARLY EVERY DAY
8. IF YOU CHECKED OFF ANY PROBLEMS, HOW DIFFICULT HAVE THESE MADE IT FOR YOU TO DO YOUR WORK, TAKE CARE OF THINGS AT HOME, OR GET ALONG WITH OTHER PEOPLE?: SOMEWHAT DIFFICULT
IF YOU CHECKED OFF ANY PROBLEMS ON THIS QUESTIONNAIRE, HOW DIFFICULT HAVE THESE PROBLEMS MADE IT FOR YOU TO DO YOUR WORK, TAKE CARE OF THINGS AT HOME, OR GET ALONG WITH OTHER PEOPLE: SOMEWHAT DIFFICULT

## 2022-09-13 ASSESSMENT — ENCOUNTER SYMPTOMS
HEMATURIA: 0
CONSTIPATION: 0
COUGH: 0
ABDOMINAL PAIN: 0
SLEEP DISTURBANCE: 1
FEVER: 0
SHORTNESS OF BREATH: 0
SORE THROAT: 0
FREQUENCY: 0
BREAST MASS: 0
EYE PAIN: 0
CHILLS: 0
NAUSEA: 0
PALPITATIONS: 0
HEARTBURN: 0
MYALGIAS: 0
DYSURIA: 0
DIZZINESS: 0
ARTHRALGIAS: 0
HEADACHES: 0
NERVOUS/ANXIOUS: 1
HEMATOCHEZIA: 0
PARESTHESIAS: 0
JOINT SWELLING: 0
DIARRHEA: 0
WEAKNESS: 0

## 2022-09-13 ASSESSMENT — PATIENT HEALTH QUESTIONNAIRE - PHQ9
10. IF YOU CHECKED OFF ANY PROBLEMS, HOW DIFFICULT HAVE THESE PROBLEMS MADE IT FOR YOU TO DO YOUR WORK, TAKE CARE OF THINGS AT HOME, OR GET ALONG WITH OTHER PEOPLE: SOMEWHAT DIFFICULT
SUM OF ALL RESPONSES TO PHQ QUESTIONS 1-9: 15
SUM OF ALL RESPONSES TO PHQ QUESTIONS 1-9: 15

## 2022-09-13 ASSESSMENT — PAIN SCALES - GENERAL: PAINLEVEL: NO PAIN (0)

## 2022-09-13 NOTE — PATIENT INSTRUCTIONS
Sameer Oropeza,    Thank you for allowing [facility] to manage your care.    I sent your prescriptions to your pharmacy.    If you have any questions or concerns, please feel free to call us at [phone].    Sincerely,    Dr. Weaver    Did you know?      You can schedule a video visit for follow-up appointments as well as future appointments for certain conditions.  Please see the below link.     [email or web address]    If you have not already done so,  I encourage you to sign up for [email or web address] ([email or web address]  This will allow you to review your results, securely communicate with a provider, and schedule virtual visits as well.

## 2022-09-13 NOTE — LETTER
My Depression Action Plan  Name: Johnna DOLL Ikuemonisaezra   Date of Birth 1985  Date: 9/13/2022    My doctor: Satish [facility] Gianna   My clinic: [facility] [place]  [address]  [place] [address]  [phone]          GREEN    ZONE   Good Control    What it looks like:     Things are going generally well. You have normal ups and downs. You may even feel depressed from time to time, but bad moods usually last less than a day.   What you need to do:  1. Continue to care for yourself (see self care plan)  2. Check your depression survival kit and update it as needed  3. Follow your physician s recommendations including any medication.  4. Do not stop taking medication unless you consult with your physician first.           YELLOW         ZONE Getting Worse    What it looks like:     Depression is starting to interfere with your life.     It may be hard to get out of bed; you may be starting to isolate yourself from others.    Symptoms of depression are starting to last most all day and this has happened for several days.     You may have suicidal thoughts but they are not constant.   What you need to do:     1. Call your care team. Your response to treatment will improve if you keep your care team informed of your progress. Yellow periods are signs an adjustment may need to be made.     2. Continue your self-care.  Just get dressed and ready for the day.  Don't give yourself time to talk yourself out of it.    3. Talk to someone in your support network.    4. Open up your Depression Self-Care Plan/Wellness Kit.           RED    ZONE Medical Alert - Get Help    What it looks like:     Depression is seriously interfering with your life.     You may experience these or other symptoms: You can t get out of bed most days, can t work or engage in other necessary activities, you have trouble taking care of basic hygiene, or basic responsibilities, thoughts of  suicide or death that will not go away, self-injurious behavior.     What you need to do:  1. Call your care team and request a same-day appointment. If they are not available (weekends or after hours) call your local crisis line, emergency room or 911.          Depression Self-Care Plan / Wellness Kit    Many people find that medication and therapy are helpful treatments for managing depression. In addition, making small changes to your everyday life can help to boost your mood and improve your wellbeing. Below are some tips for you to consider. Be sure to talk with your medical provider and/or behavioral health consultant if your symptoms are worsening or not improving.     Sleep   Sleep hygiene  means all of the habits that support good, restful sleep. It includes maintaining a consistent bedtime and wake time, using your bedroom only for sleeping or sex, and keeping the bedroom dark and free of distractions like a computer, smartphone, or television.     Develop a Healthy Routine  Maintain good hygiene. Get out of bed in the morning, make your bed, brush your teeth, take a shower, and get dressed. Don t spend too much time viewing media that makes you feel stressed. Find time to relax each day.    Exercise  Get some form of exercise every day. This will help reduce pain and release endorphins, the  feel good  chemicals in your brain. It can be as simple as just going for a walk or doing some gardening, anything that will get you moving.      Diet  Strive to eat healthy foods, including fruits and vegetables. Drink plenty of water. Avoid excessive sugar, caffeine, alcohol, and other mood-altering substances.     Stay Connected with Others  Stay in touch with friends and family members.    Manage Your Mood  Try deep breathing, massage therapy, biofeedback, or meditation. Take part in fun activities when you can. Try to find something to smile about each day.     Psychotherapy  Be open to working with a therapist  if your provider recommends it.     Medication  Be sure to take your medication as prescribed. Most anti-depressants need to be taken every day. It usually takes several weeks for medications to work. Not all medicines work for all people. It is important to follow-up with your provider to make sure you have a treatment plan that is working for you. Do not stop your medication abruptly without first discussing it with your provider.    Crisis Resources   These hotlines are for both adults and children. They and are open 24 hours a day, 7 days a week unless noted otherwise.      National Suicide Prevention Lifeline   988 or 3-168-192-BVSH (6329)      Crisis Text Line    www.crisistextline.org  Text HOME to 627064 from anywhere in the [place], anytime, about any type of crisis. A live, trained crisis counselor will receive the text and respond quickly.      [facility] Lifeline for LGBTQ Youth  A national crisis intervention and suicide lifeline for LGBTQ youth under 25. Provides a safe place to talk without judgement. Call 1-526.611.8305; text START to 607782 or visit www.thetrevorproject.org to talk to a trained counselor.      For Count includes the [facility] crisis numbers, visit the [facility] website at:  https://[place].gov/dhs/people-we-serve/adults/health-care/mental-health/resources/crisis-contacts.jsp

## 2022-09-13 NOTE — LETTER
September 13, 2022      Johnna Maher  [address]  [place] [address]        To Whom It May Concern:    Johnna Maher was seen in our clinic.  Due to her underlying medical condition (generalized anxiety disorder), she would benefit from the following accommodations until next year (9/13/23):    Allowed extended amount of time for exams  Allow a separate space during exam to allow her to focus    Sincerely,        Dr. Gino Weaver

## 2023-07-29 ENCOUNTER — HEALTH MAINTENANCE LETTER (OUTPATIENT)
Age: 38
End: 2023-07-29

## 2023-08-21 ASSESSMENT — ENCOUNTER SYMPTOMS
MYALGIAS: 0
CONSTIPATION: 0
DYSURIA: 0
DIZZINESS: 0
HEARTBURN: 0
FREQUENCY: 0
JOINT SWELLING: 0
CHILLS: 0
COUGH: 0
SHORTNESS OF BREATH: 0
NAUSEA: 0
SORE THROAT: 0
ABDOMINAL PAIN: 0
ARTHRALGIAS: 0
WEAKNESS: 0
HEADACHES: 0
EYE PAIN: 0
NERVOUS/ANXIOUS: 0
PARESTHESIAS: 0
DIARRHEA: 0
HEMATURIA: 0
FEVER: 0
PALPITATIONS: 0
HEMATOCHEZIA: 0
BREAST MASS: 0

## 2023-08-21 ASSESSMENT — PATIENT HEALTH QUESTIONNAIRE - PHQ9
SUM OF ALL RESPONSES TO PHQ QUESTIONS 1-9: 1
SUM OF ALL RESPONSES TO PHQ QUESTIONS 1-9: 1
10. IF YOU CHECKED OFF ANY PROBLEMS, HOW DIFFICULT HAVE THESE PROBLEMS MADE IT FOR YOU TO DO YOUR WORK, TAKE CARE OF THINGS AT HOME, OR GET ALONG WITH OTHER PEOPLE: NOT DIFFICULT AT ALL

## 2023-08-22 ENCOUNTER — OFFICE VISIT (OUTPATIENT)
Dept: FAMILY MEDICINE | Facility: CLINIC | Age: 38
End: 2023-08-22
Payer: COMMERCIAL

## 2023-08-22 VITALS
HEIGHT: 65 IN | OXYGEN SATURATION: 100 % | DIASTOLIC BLOOD PRESSURE: 84 MMHG | SYSTOLIC BLOOD PRESSURE: 118 MMHG | WEIGHT: 205.2 LBS | TEMPERATURE: 98.2 F | BODY MASS INDEX: 34.19 KG/M2 | RESPIRATION RATE: 22 BRPM | HEART RATE: 87 BPM

## 2023-08-22 DIAGNOSIS — Z11.59 NEED FOR HEPATITIS C SCREENING TEST: ICD-10-CM

## 2023-08-22 DIAGNOSIS — Z11.4 SCREENING FOR HIV (HUMAN IMMUNODEFICIENCY VIRUS): ICD-10-CM

## 2023-08-22 DIAGNOSIS — Z00.00 ROUTINE GENERAL MEDICAL EXAMINATION AT A HEALTH CARE FACILITY: Primary | ICD-10-CM

## 2023-08-22 DIAGNOSIS — Z13.6 CARDIOVASCULAR SCREENING; LDL GOAL LESS THAN 160: ICD-10-CM

## 2023-08-22 DIAGNOSIS — Z32.01 PREGNANCY EXAMINATION OR TEST, POSITIVE RESULT: ICD-10-CM

## 2023-08-22 DIAGNOSIS — Z13.1 SCREENING FOR DIABETES MELLITUS: ICD-10-CM

## 2023-08-22 LAB
ALBUMIN SERPL BCG-MCNC: 3.9 G/DL (ref 3.5–5.2)
ALP SERPL-CCNC: 50 U/L (ref 35–104)
ALT SERPL W P-5'-P-CCNC: 16 U/L (ref 0–50)
ANION GAP SERPL CALCULATED.3IONS-SCNC: 9 MMOL/L (ref 7–15)
AST SERPL W P-5'-P-CCNC: 18 U/L (ref 0–45)
BILIRUB SERPL-MCNC: 0.4 MG/DL
BUN SERPL-MCNC: 6.7 MG/DL (ref 6–20)
CALCIUM SERPL-MCNC: 9.3 MG/DL (ref 8.6–10)
CHLORIDE SERPL-SCNC: 106 MMOL/L (ref 98–107)
CHOLEST SERPL-MCNC: 136 MG/DL
CREAT SERPL-MCNC: 0.71 MG/DL (ref 0.51–0.95)
DEPRECATED HCO3 PLAS-SCNC: 22 MMOL/L (ref 22–29)
GFR SERPL CREATININE-BSD FRML MDRD: >90 ML/MIN/1.73M2
GLUCOSE SERPL-MCNC: 91 MG/DL (ref 70–99)
HCG UR QL: POSITIVE
HCV AB SERPL QL IA: NONREACTIVE
HDLC SERPL-MCNC: 68 MG/DL
HIV 1+2 AB+HIV1 P24 AG SERPL QL IA: NONREACTIVE
LDLC SERPL CALC-MCNC: 58 MG/DL
NONHDLC SERPL-MCNC: 68 MG/DL
POTASSIUM SERPL-SCNC: 4.1 MMOL/L (ref 3.4–5.3)
PROT SERPL-MCNC: 7.1 G/DL (ref 6.4–8.3)
SODIUM SERPL-SCNC: 137 MMOL/L (ref 136–145)
TRIGL SERPL-MCNC: 50 MG/DL

## 2023-08-22 PROCEDURE — 81025 URINE PREGNANCY TEST: CPT | Performed by: FAMILY MEDICINE

## 2023-08-22 PROCEDURE — 80053 COMPREHEN METABOLIC PANEL: CPT | Performed by: FAMILY MEDICINE

## 2023-08-22 PROCEDURE — 80061 LIPID PANEL: CPT | Performed by: FAMILY MEDICINE

## 2023-08-22 PROCEDURE — 87389 HIV-1 AG W/HIV-1&-2 AB AG IA: CPT | Performed by: FAMILY MEDICINE

## 2023-08-22 PROCEDURE — 99395 PREV VISIT EST AGE 18-39: CPT | Performed by: FAMILY MEDICINE

## 2023-08-22 PROCEDURE — 36415 COLL VENOUS BLD VENIPUNCTURE: CPT | Performed by: FAMILY MEDICINE

## 2023-08-22 PROCEDURE — 86803 HEPATITIS C AB TEST: CPT | Performed by: FAMILY MEDICINE

## 2023-08-22 ASSESSMENT — ENCOUNTER SYMPTOMS
COUGH: 0
SHORTNESS OF BREATH: 0
ABDOMINAL PAIN: 0
BREAST MASS: 0
JOINT SWELLING: 0
FEVER: 0
HEMATURIA: 0
NAUSEA: 0
MYALGIAS: 0
ARTHRALGIAS: 0
WEAKNESS: 0
NERVOUS/ANXIOUS: 0
CHILLS: 0
CONSTIPATION: 0
DIARRHEA: 0
EYE PAIN: 0
HEARTBURN: 0
PARESTHESIAS: 0
DIZZINESS: 0
FREQUENCY: 0
DYSURIA: 0
HEMATOCHEZIA: 0
PALPITATIONS: 0
SORE THROAT: 0
HEADACHES: 0

## 2023-08-22 ASSESSMENT — PAIN SCALES - GENERAL: PAINLEVEL: NO PAIN (0)

## 2023-08-22 NOTE — PROGRESS NOTES
SUBJECTIVE:   CC: Johnna is an 38 year old who presents for preventive health visit.       8/22/2023     9:00 AM   Additional Questions   Roomed by Magdalene       Healthy Habits:     Getting at least 3 servings of Calcium per day:  Yes    Bi-annual eye exam:  NO    Dental care twice a year:  Yes    Sleep apnea or symptoms of sleep apnea:  None    Diet:  Regular (no restrictions)    Frequency of exercise:  2-3 days/week    Duration of exercise:  30-45 minutes    Taking medications regularly:  Yes    Medication side effects:  Not applicable    Additional concerns today:  Yes      Today's PHQ-9 Score:       8/21/2023     8:49 PM   PHQ-9 SCORE   PHQ-9 Total Score MyChart 1 (Minimal depression)   PHQ-9 Total Score 1       Patient stated she tested positive with home pregnancy test about 3 weeks ago. Patient wants confirmation.      Social History     Tobacco Use    Smoking status: Never    Smokeless tobacco: Never   Substance Use Topics    Alcohol use: Yes     Comment: minimal             8/21/2023     8:56 PM   Alcohol Use   Prescreen: >3 drinks/day or >7 drinks/week? No          No data to display              Reviewed orders with patient.  Reviewed health maintenance and updated orders accordingly - Yes  Lab work is in process  Labs reviewed in EPIC  BP Readings from Last 3 Encounters:   08/22/23 118/84   09/13/22 131/87   08/01/22 128/78    Wt Readings from Last 3 Encounters:   08/22/23 93.1 kg (205 lb 3.2 oz)   09/13/22 92.7 kg (204 lb 6.4 oz)   08/01/22 93.4 kg (205 lb 12.8 oz)                  Patient Active Problem List   Diagnosis    Latent tuberculosis by blood test    IUD (intrauterine device) in place     Past Surgical History:   Procedure Laterality Date    BRONCHOSCOPY (RIGID OR FLEXIBLE), DIAGNOSTIC N/A 1/2/2019    Procedure: Bronchoscopy with Lavage;  Surgeon: Rima Camacho MD;  Location: [facility]       Social History     Tobacco Use    Smoking status: Never    Smokeless tobacco: Never   Substance  Use Topics    Alcohol use: Yes     Comment: minimal     No family history on file.      Current Outpatient Medications   Medication Sig Dispense Refill    amitriptyline (ELAVIL) 25 MG tablet Take 1 tablet (25 mg) by mouth At Bedtime (Patient not taking: Reported on 2023) 60 tablet 0    isoniazid (NYDRAZID) 300 MG tablet Take 1 tablet (300 mg) by mouth daily (Patient not taking: Reported on 2022) 90 tablet 1    levonorgestrel (MIRENA) 20 MCG/DAY IUD 1 each (20 mcg) by Intrauterine route once (Patient not taking: Reported on 2022)      propranolol (INDERAL) 10 MG tablet Take 1 tablet (10 mg) by mouth 3 times daily (Patient not taking: Reported on 2022) 30 tablet 1     No Known Allergies    Breast Cancer Screenin/21/2023     8:57 PM   Breast CA Risk Assessment (FHS-7)   Do you have a family history of breast, colon, or ovarian cancer? No / Unknown       Pertinent mammograms are reviewed under the imaging tab.    History of abnormal Pap smear: NO - age 30-65 PAP every 5 years with negative HPV co-testing recommended      Latest Ref Rng & Units 2022    10:53 AM 2018    10:05 AM 2018     9:50 AM   PAP / HPV   PAP  Negative for Intraepithelial Lesion or Malignancy (NILM)      PAP (Historical)   NIL     HPV 16 DNA Negative Negative   Negative    HPV 18 DNA Negative Negative   Negative    Other HR HPV Negative Negative   Negative      Reviewed and updated as needed this visit by clinical staff   Tobacco  Allergies               Reviewed and updated as needed this visit by Provider                     Review of Systems   Constitutional:  Negative for chills and fever.   HENT:  Negative for congestion, ear pain, hearing loss and sore throat.    Eyes:  Negative for pain and visual disturbance.   Respiratory:  Negative for cough and shortness of breath.    Cardiovascular:  Negative for chest pain, palpitations and peripheral edema.   Gastrointestinal:  Negative for abdominal pain,  "constipation, diarrhea, heartburn, hematochezia and nausea.   Breasts:  Negative for tenderness, breast mass and discharge.   Genitourinary:  Negative for dysuria, frequency, genital sores, hematuria, pelvic pain, urgency, vaginal bleeding and vaginal discharge.   Musculoskeletal:  Negative for arthralgias, joint swelling and myalgias.   Skin:  Negative for rash.   Neurological:  Negative for dizziness, weakness, headaches and paresthesias.   Psychiatric/Behavioral:  Negative for mood changes. The patient is not nervous/anxious.      CONSTITUTIONAL: NEGATIVE for fever, chills, change in weight  INTEGUMENTARU/SKIN: NEGATIVE for worrisome rashes, moles or lesions  EYES: NEGATIVE for vision changes or irritation  ENT: NEGATIVE for ear, mouth and throat problems  RESP: NEGATIVE for significant cough or SOB  BREAST: NEGATIVE for masses, tenderness or discharge  CV: NEGATIVE for chest pain, palpitations or peripheral edema  GI: NEGATIVE for nausea, abdominal pain, heartburn, or change in bowel habits  : NEGATIVE for unusual urinary or vaginal symptoms. Periods are regular.  MUSCULOSKELETAL: NEGATIVE for significant arthralgias or myalgia  NEURO: NEGATIVE for weakness, dizziness or paresthesias  PSYCHIATRIC: NEGATIVE for changes in mood or affect     OBJECTIVE:   /84 (BP Location: Left arm, Patient Position: Sitting, Cuff Size: Adult Regular)   Pulse 87   Temp 98.2  F (36.8  C) (Oral)   Resp 22   Ht 1.66 m (5' 5.35\")   Wt 93.1 kg (205 lb 3.2 oz)   LMP  (LMP Unknown)   SpO2 100%   BMI 33.78 kg/m    Physical Exam  GENERAL: healthy, alert and no distress  NECK: no adenopathy, no asymmetry, masses, or scars and thyroid normal to palpation  RESP: lungs clear to auscultation - no rales, rhonchi or wheezes  CV: regular rate and rhythm, normal S1 S2, no S3 or S4, no murmur, click or rub, no peripheral edema and peripheral pulses strong  ABDOMEN: soft, nontender, no hepatosplenomegaly, no masses and bowel sounds " normal  MS: no gross musculoskeletal defects noted, no edema    Diagnostic Test Results:  Labs reviewed in Epic    ASSESSMENT/PLAN:   (Z00.00) Routine general medical examination at a health care facility  (primary encounter diagnosis)  Comment:   Plan: as below.    (Z13.6) CARDIOVASCULAR SCREENING; LDL GOAL LESS THAN 160  Comment:   Plan: Comprehensive metabolic panel (BMP + Alb, Alk         Phos, ALT, AST, Total. Bili, TP), Lipid panel         reflex to direct LDL Fasting            (Z13.1) Screening for diabetes mellitus  Comment:   Plan: Comprehensive metabolic panel (BMP + Alb, Alk         Phos, ALT, AST, Total. Bili, TP)            (Z11.4) Screening for HIV (human immunodeficiency virus)  Comment:   Plan: HIV Antigen Antibody Combo            (Z11.59) Need for hepatitis C screening test  Comment:   Plan: Hepatitis C Screen Reflex to HCV RNA Quant and         Genotype            (Z32.01) Pregnancy examination or test, positive result  Comment:   Plan: HCG qualitative urine, Ob/Gyn Referral            Patient has been advised of split billing requirements and indicates understanding: Yes      COUNSELING:  Reviewed preventive health counseling, as reflected in patient instructions       Regular exercise       Healthy diet/nutrition       Vision screening        She reports that she has never smoked. She has never used smokeless tobacco.          Taz Paulson MD, MD  [facility]  Answers submitted by the patient for this visit:  Preventative Adult Visit on 8/22/2023  9:00 AM with Taz Paulson MD  Patient Health Questionnaire (Submitted on 8/21/2023)  If you checked off any problems, how difficult have these problems made it for you to do your work, take care of things at home, or get along with other people?: Not difficult at all  PHQ9 TOTAL SCORE: 1  Annual Preventive Visit (Submitted on 8/21/2023)  Chief Complaint: Annual Exam:  Frequency of exercise:: 2-3 days/week  Getting at least 3  servings of Calcium per day:: Yes  Diet:: Regular (no restrictions)  Taking medications regularly:: Yes  Medication side effects:: Not applicable  Bi-annual eye exam:: NO  Dental care twice a year:: Yes  Sleep apnea or symptoms of sleep apnea:: None  abdominal pain: No  Blood in stool: No  Blood in urine: No  chest pain: No  chills: No  congestion: No  constipation: No  cough: No  diarrhea: No  dizziness: No  ear pain: No  eye pain: No  nervous/anxious: No  fever: No  frequency: No  genital sores: No  headaches: No  hearing loss: No  heartburn: No  arthralgias: No  joint swelling: No  peripheral edema: No  mood changes: No  myalgias: No  nausea: No  dysuria: No  palpitations: No  Skin sensation changes: No  sore throat: No  urgency: No  rash: No  shortness of breath: No  visual disturbance: No  weakness: No  pelvic pain: No  vaginal bleeding: No  vaginal discharge: No  tenderness: No  breast mass: No  breast discharge: No  Additional concerns today:: Yes  Exercise outside of work (Submitted on 8/21/2023)  Chief Complaint: Annual Exam:  Duration of exercise:: 30-45 minutes

## 2023-08-29 ENCOUNTER — VIRTUAL VISIT (OUTPATIENT)
Dept: OBGYN | Facility: CLINIC | Age: 38
End: 2023-08-29
Payer: COMMERCIAL

## 2023-08-29 DIAGNOSIS — O09.529 ANTEPARTUM MULTIGRAVIDA OF ADVANCED MATERNAL AGE: Primary | ICD-10-CM

## 2023-08-29 PROCEDURE — 99207 PR NO CHARGE NURSE ONLY: CPT

## 2023-08-29 NOTE — PROGRESS NOTES
Telephone visit with patient for New Prenatal Intake and Education. This is patient's third pregnancy. Handouts reviewed and will be provided at next prenatal appointment. Scheduled for New Prenatal with Dr. Agbeh on 9/22/2023.       Prenatal OB Questionnaire  Patient supplied answers from flow sheet for:  Prenatal OB Questionnaire.  Past Medical History  Have you ever recieved care for your mental health? : No  Have you ever been in a major accident or suffered serious trauma?: No  Within the last year, has anyone hit, slapped, kicked or otherwise hurt you?: No  In the last year, has anyone forced you to have sex when you didn't want to?: No    Past Medical History 2   Have you ever received a blood transfusion?: No  Would you accept a blood transfusion if was medically recommended?: Yes  Does anyone in your home smoke?: No   Is your blood type Rh negative?: Unknown  Have you ever ?: (!) Yes (1 year for each)  Have you been hospitalized for a nonsurgical reason excluding normal delivery?: No  Have you ever had an abnormal pap smear?: No    Past Medical History (Continued)  Do you have a history of abnormalities of the uterus?: No  Did your mother take GEOVANI or any other hormones when she was pregnant with you?: Unknown  Do you have any other problems we have not asked about which you feel may be important to this pregnancy?: (!) Yes (severe headache)                     Allergies as of 8/29/2023:    Allergies as of 08/29/2023    (No Known Allergies)       Current medications are:  Current Outpatient Medications   Medication Sig Dispense Refill    amitriptyline (ELAVIL) 25 MG tablet Take 1 tablet (25 mg) by mouth At Bedtime (Patient not taking: Reported on 8/22/2023) 60 tablet 0    isoniazid (NYDRAZID) 300 MG tablet Take 1 tablet (300 mg) by mouth daily (Patient not taking: Reported on 9/13/2022) 90 tablet 1    propranolol (INDERAL) 10 MG tablet Take 1 tablet (10 mg) by mouth 3 times daily (Patient not  taking: Reported on 8/1/2022) 30 tablet 1     Madelyn Mcghee RN on 8/29/2023 at 10:11 AM

## 2023-09-21 LAB
ABO/RH(D): NORMAL
ANTIBODY SCREEN: NEGATIVE
SPECIMEN EXPIRATION DATE: NORMAL

## 2023-09-22 ENCOUNTER — PRE VISIT (OUTPATIENT)
Dept: MATERNAL FETAL MEDICINE | Facility: CLINIC | Age: 38
End: 2023-09-22

## 2023-09-22 ENCOUNTER — PRENATAL OFFICE VISIT (OUTPATIENT)
Dept: OBGYN | Facility: CLINIC | Age: 38
End: 2023-09-22
Payer: COMMERCIAL

## 2023-09-22 ENCOUNTER — TRANSCRIBE ORDERS (OUTPATIENT)
Dept: MATERNAL FETAL MEDICINE | Facility: CLINIC | Age: 38
End: 2023-09-22

## 2023-09-22 VITALS
DIASTOLIC BLOOD PRESSURE: 80 MMHG | SYSTOLIC BLOOD PRESSURE: 114 MMHG | WEIGHT: 205.2 LBS | BODY MASS INDEX: 33.78 KG/M2 | HEART RATE: 100 BPM

## 2023-09-22 DIAGNOSIS — O09.529 ANTEPARTUM MULTIGRAVIDA OF ADVANCED MATERNAL AGE: ICD-10-CM

## 2023-09-22 DIAGNOSIS — Z32.01 PREGNANCY EXAMINATION OR TEST, POSITIVE RESULT: ICD-10-CM

## 2023-09-22 DIAGNOSIS — O26.90 PREGNANCY RELATED CONDITION, ANTEPARTUM: Primary | ICD-10-CM

## 2023-09-22 LAB
ALBUMIN UR-MCNC: 30 MG/DL
AMORPH CRY #/AREA URNS HPF: ABNORMAL /HPF
APPEARANCE UR: ABNORMAL
BACTERIA #/AREA URNS HPF: ABNORMAL /HPF
BILIRUB UR QL STRIP: NEGATIVE
COLOR UR AUTO: YELLOW
ERYTHROCYTE [DISTWIDTH] IN BLOOD BY AUTOMATED COUNT: 13.9 % (ref 10–15)
GLUCOSE UR STRIP-MCNC: NEGATIVE MG/DL
HCT VFR BLD AUTO: 34.3 % (ref 35–47)
HGB BLD-MCNC: 12.4 G/DL (ref 11.7–15.7)
HGB UR QL STRIP: NEGATIVE
KETONES UR STRIP-MCNC: NEGATIVE MG/DL
LEUKOCYTE ESTERASE UR QL STRIP: NEGATIVE
MCH RBC QN AUTO: 27.6 PG (ref 26.5–33)
MCHC RBC AUTO-ENTMCNC: 36.2 G/DL (ref 31.5–36.5)
MCV RBC AUTO: 76 FL (ref 78–100)
MUCOUS THREADS #/AREA URNS LPF: PRESENT /LPF
NITRATE UR QL: NEGATIVE
PH UR STRIP: 8.5 [PH] (ref 5–7)
PLATELET # BLD AUTO: 214 10E3/UL (ref 150–450)
RBC # BLD AUTO: 4.49 10E6/UL (ref 3.8–5.2)
RBC #/AREA URNS AUTO: ABNORMAL /HPF
SP GR UR STRIP: 1.02 (ref 1–1.03)
SQUAMOUS #/AREA URNS AUTO: ABNORMAL /LPF
UROBILINOGEN UR STRIP-ACNC: 0.2 E.U./DL
WBC # BLD AUTO: 5.6 10E3/UL (ref 4–11)
WBC #/AREA URNS AUTO: ABNORMAL /HPF
YEAST #/AREA URNS HPF: ABNORMAL /HPF

## 2023-09-22 PROCEDURE — 87086 URINE CULTURE/COLONY COUNT: CPT | Performed by: OBSTETRICS & GYNECOLOGY

## 2023-09-22 PROCEDURE — 87591 N.GONORRHOEAE DNA AMP PROB: CPT | Performed by: OBSTETRICS & GYNECOLOGY

## 2023-09-22 PROCEDURE — 86901 BLOOD TYPING SEROLOGIC RH(D): CPT | Performed by: OBSTETRICS & GYNECOLOGY

## 2023-09-22 PROCEDURE — 87340 HEPATITIS B SURFACE AG IA: CPT | Performed by: OBSTETRICS & GYNECOLOGY

## 2023-09-22 PROCEDURE — 86762 RUBELLA ANTIBODY: CPT | Performed by: OBSTETRICS & GYNECOLOGY

## 2023-09-22 PROCEDURE — 86803 HEPATITIS C AB TEST: CPT | Performed by: OBSTETRICS & GYNECOLOGY

## 2023-09-22 PROCEDURE — 86780 TREPONEMA PALLIDUM: CPT | Performed by: OBSTETRICS & GYNECOLOGY

## 2023-09-22 PROCEDURE — 87491 CHLMYD TRACH DNA AMP PROBE: CPT | Performed by: OBSTETRICS & GYNECOLOGY

## 2023-09-22 PROCEDURE — 99203 OFFICE O/P NEW LOW 30 MIN: CPT | Performed by: OBSTETRICS & GYNECOLOGY

## 2023-09-22 PROCEDURE — 81001 URINALYSIS AUTO W/SCOPE: CPT | Performed by: OBSTETRICS & GYNECOLOGY

## 2023-09-22 PROCEDURE — 86900 BLOOD TYPING SEROLOGIC ABO: CPT | Performed by: OBSTETRICS & GYNECOLOGY

## 2023-09-22 PROCEDURE — 36415 COLL VENOUS BLD VENIPUNCTURE: CPT | Performed by: OBSTETRICS & GYNECOLOGY

## 2023-09-22 PROCEDURE — 85027 COMPLETE CBC AUTOMATED: CPT | Performed by: OBSTETRICS & GYNECOLOGY

## 2023-09-22 PROCEDURE — 86850 RBC ANTIBODY SCREEN: CPT | Performed by: OBSTETRICS & GYNECOLOGY

## 2023-09-22 RX ORDER — ACETAMINOPHEN 325 MG/1
325-650 TABLET ORAL EVERY 6 HOURS PRN
COMMUNITY

## 2023-09-22 RX ORDER — PRENATAL VIT/IRON FUM/FOLIC AC 27MG-0.8MG
1 TABLET ORAL DAILY
Qty: 90 TABLET | Refills: 3 | Status: SHIPPED | OUTPATIENT
Start: 2023-09-22

## 2023-09-22 NOTE — PROGRESS NOTES
Johnna is a 38 year old  @  11w0d weeks by LMPof 23 and SANCHEZ of 24 here for new ob visit.    Dating U/S is pending.  See Ob questionnaire for pertinent components of HPI.  Current Issues include: nausea, mild    OBhx:   OB History    Para Term  AB Living   3 2 2 0 0 2   SAB IAB Ectopic Multiple Live Births   0 0 0 0 2      # Outcome Date GA Lbr Blake/2nd Weight Sex Delivery Anes PTL Lv   3 Current            2 Term 08/29/15    M Vag-Spont   SARAH   1 Term 14    M Vag-Spont   SARAH       Past Surgical History:   Procedure Laterality Date    BRONCHOSCOPY (RIGID OR FLEXIBLE), DIAGNOSTIC N/A 2019    Procedure: Bronchoscopy with Lavage;  Surgeon: Rima Camacho MD;  Location: [facility]      Past Medical History:   Diagnosis Date    History of ovarian cyst     IUD (intrauterine device) in place      Past Surgical History:   Procedure Laterality Date    BRONCHOSCOPY (RIGID OR FLEXIBLE), DIAGNOSTIC N/A 2019    Procedure: Bronchoscopy with Lavage;  Surgeon: Rima Camacho MD;  Location: [facility]     Patient Active Problem List    Diagnosis Date Noted    IUD (intrauterine device) in place 2021     Priority: Medium     Since 2017 in [place]      Latent tuberculosis by blood test 2020     Priority: Medium      No Known Allergies  Current Outpatient Medications   Medication Sig Dispense Refill    acetaminophen (TYLENOL) 325 MG tablet Take 325-650 mg by mouth every 6 hours as needed for mild pain      Prenatal Vit-Fe Fumarate-FA (PRENATAL MULTIVITAMIN W/IRON) 27-0.8 MG tablet Take 1 tablet by mouth daily 90 tablet 3    amitriptyline (ELAVIL) 25 MG tablet Take 1 tablet (25 mg) by mouth At Bedtime (Patient not taking: Reported on 2023) 60 tablet 0    isoniazid (NYDRAZID) 300 MG tablet Take 1 tablet (300 mg) by mouth daily (Patient not taking: Reported on 2022) 90 tablet 1    propranolol (INDERAL) 10 MG tablet Take 1 tablet (10 mg) by mouth 3 times daily (Patient not  taking: Reported on 2022) 30 tablet 1       Past Medical History of Father of Baby:   No significant medical history    Physical Exam: /80 (BP Location: Right arm, Patient Position: Sitting, Cuff Size: Adult Large)   Pulse 100   Wt 93.1 kg (205 lb 3.2 oz)   LMP 2023   BMI 33.78 kg/m    General: Well developed, well nourished female  Skin: Normal  HEENT: Normal  Neck: Supple,no adenopathy,thyroid normal  Chest: Clear  Heart: Regular rate, rhythm,No murmur, rub, gallop  Breasts: No masses, skin, nipple or axillary changes and Not examined   Abdomen: Benign,Soft, flat, non-tender,No masses, organomegaly,No inguinal nodes,Bowel sounds normoactive   Extremities: Normal  Neurological: Normal   Perineum: Intact   Vulva: Normal  Vagina: Normal mucosa, no discharge  Cervix: Parous, closed, mobile, no discharge  Uterus: 10 weeks, Normal shape, position and consistency   Adnexa: Normal  Rectum: deferred, Normal without lesion or mass   Bony Pelvis: Adequate   NURSE PRESENT FOR VISIT.    A/P 38 year old  at  11w0d weeks    ICD-10-CM    1. Antepartum multigravida of advanced maternal age  O09.529 Hepatitis C antibody     UA reflex to Microscopic     Urine Culture Aerobic Bacterial     Treponema Abs w Reflex to RPR and Titer     Rubella Antibody IgG     CBC with platelets     Hepatitis B surface antigen     ABO/Rh type and screen     [facility] Referral - Pregnancy     Prenatal Vit-Fe Fumarate-FA (PRENATAL MULTIVITAMIN W/IRON) 27-0.8 MG tablet      2. Pregnancy examination or test, positive result  Z32.01 Ob/Gyn Referral     [facility] Referral - Pregnancy     Prenatal Vit-Fe Fumarate-FA (PRENATAL MULTIVITAMIN W/IRON) 27-0.8 MG tablet          - Discussed physician coverage, tertiary support, diet, exercise, weight gain, schedule of visits, routine and indicated ultrasounds, and childbirth education.    - Options for  testing for chromosomal and birth defects were discussed with  the patient.  Diagnostic tests include CVS and Amniocentesis.  We discussed that these tests are definitive but invasive and do carry a risk of fetal loss.    Screening tests include nuchal translucency/blood marker testing in the first trimester and quad screening in the second trimester.  We discussed that these are screening tests and not diagnostic tests and that false positives and negatives are a distinct possibility.  The patient wants first trimester testing..    Return to clinic in 4 weeks.    CEPHAS AGBEH, MD.    Answers submitted by the patient for this visit:  Patient Health Questionnaire (Submitted on 9/22/2023)  If you checked off any problems, how difficult have these problems made it for you to do your work, take care of things at home, or get along with other people?: Not difficult at all  PHQ9 TOTAL SCORE: 0

## 2023-09-22 NOTE — PATIENT INSTRUCTIONS
To Schedule an Appointment 24/7  Call: [phone]    If you have any questions regarding your visit, Please contact your care team.  [facility]: [phone]  [facility] HOURS TELEPHONE NUMBER   Cephas Agbeh, M.D.      Alexia Whitten-Surgery Scheduler  Amie-Surgery Scheduler         Monday - [place]:    8:00 am-4:45 pm  Tuesday - [place]:   8:00 am-4:45 pm  Friday-[place]:       8:00 am-4:45 pm  Typical Surgery Day:  Wednesday [facility]   [address]   [place] [address] [phone]   Fax [phone]    Imaging Scheduling all locations  [phone]      [facility] Labor and Delivery   [address] [place] [address] Dr.   [place], [place] [address] [phone]    [facility]  [address] [facility] [address]  [phone]  Fax [phone]     Urgent Care locations:  [facility] Monday-Friday                               10 am - 8 pm  Saturday and Sunday                      9 am - 5 pm  Monday-Friday                              10 am- 8 pm  Saturday and Sunday                      9 am - 5 pm    [phone]   **Surgeries** Our Surgery Schedulers will contact you to schedule. If you do not receive a call within 3 business days, please call [phone].  If you need a medication refill, please contact your pharmacy. Please allow 3 business days for your refill to be completed.  As always, Thank you for trusting us with your healthcare needs!  see additional instructions from your care team below

## 2023-09-23 LAB
C TRACH DNA SPEC QL NAA+PROBE: NEGATIVE
N GONORRHOEA DNA SPEC QL NAA+PROBE: NEGATIVE
RUBV IGG SERPL QL IA: 22.1 INDEX
RUBV IGG SERPL QL IA: POSITIVE
T PALLIDUM AB SER QL: NONREACTIVE

## 2023-09-24 LAB — BACTERIA UR CULT: NO GROWTH

## 2023-09-25 LAB
HBV SURFACE AG SERPL QL IA: NONREACTIVE
HCV AB SERPL QL IA: NONREACTIVE

## 2023-10-04 ENCOUNTER — ANCILLARY PROCEDURE (OUTPATIENT)
Dept: ULTRASOUND IMAGING | Facility: HOSPITAL | Age: 38
End: 2023-10-04
Attending: OBSTETRICS & GYNECOLOGY

## 2023-10-04 ENCOUNTER — OFFICE VISIT (OUTPATIENT)
Dept: MATERNAL FETAL MEDICINE | Facility: HOSPITAL | Age: 38
End: 2023-10-04
Attending: OBSTETRICS & GYNECOLOGY

## 2023-10-04 ENCOUNTER — LAB (OUTPATIENT)
Dept: LAB | Facility: HOSPITAL | Age: 38
End: 2023-10-04
Attending: OBSTETRICS & GYNECOLOGY

## 2023-10-04 DIAGNOSIS — O26.90 PREGNANCY RELATED CONDITION, ANTEPARTUM: ICD-10-CM

## 2023-10-04 DIAGNOSIS — O09.521 SUPERVISION OF ELDERLY MULTIGRAVIDA IN FIRST TRIMESTER: ICD-10-CM

## 2023-10-04 DIAGNOSIS — O09.522 MULTIGRAVIDA OF ADVANCED MATERNAL AGE IN SECOND TRIMESTER: ICD-10-CM

## 2023-10-04 DIAGNOSIS — O09.521 SUPERVISION OF ELDERLY MULTIGRAVIDA IN FIRST TRIMESTER: Primary | ICD-10-CM

## 2023-10-04 DIAGNOSIS — Z36.82 NUCHAL TRANSLUCENCY OF FETUS ON PRENATAL ULTRASOUND: Primary | ICD-10-CM

## 2023-10-04 PROCEDURE — 99207 PR NO CHARGE LOS: CPT | Performed by: STUDENT IN AN ORGANIZED HEALTH CARE EDUCATION/TRAINING PROGRAM

## 2023-10-04 PROCEDURE — 96040 HC GENETIC COUNSELING, EACH 30 MINUTES: CPT | Performed by: GENETIC COUNSELOR, MS

## 2023-10-04 PROCEDURE — 36415 COLL VENOUS BLD VENIPUNCTURE: CPT

## 2023-10-04 PROCEDURE — 76813 OB US NUCHAL MEAS 1 GEST: CPT | Mod: 26 | Performed by: STUDENT IN AN ORGANIZED HEALTH CARE EDUCATION/TRAINING PROGRAM

## 2023-10-04 PROCEDURE — 76813 OB US NUCHAL MEAS 1 GEST: CPT

## 2023-10-04 NOTE — PROGRESS NOTES
Please see the full imaging report from the ViewPoint program under the imaging tab.    Lucia Dugan MD  Maternal Fetal Medicine

## 2023-10-04 NOTE — PROGRESS NOTES
[facility]  Genetic Counseling Consult    Patient:  Johnna Maher YOB: 1985   Date of Service:  10/04/23   MRN: [id number]    Johnna was seen at the [facility] for genetic consultation. The indication for genetic counseling is desire to discuss options for genetic screening and diagnostics. The patient was unaccompanied to this visit.     The session was conducted in English.      IMPRESSION/ PLAN   1. Johnna has not had genetic screening in this pregnancy but elected to have screening today.     2. During today's [facility] visit, Johnna had a blood draw for non-invasive prenatal testing (also called NIPT, NIPS, or cell-free DNA) through [employer]. This NIPT screens for trisomy 21, 18, and 13 and the patient opted to screen for sex chromosome aneuploidies, including reported fetal sex. Results are expected in 7-10 days. The patient was informed that results, including fetal sex, will be available in Merku. They would like a Merku message, rather than phone call, to disclose the results including the predicted sex.     3. Since the patient chose aneuploidy screening via NIPT, quad screen is NOT recommended in the second trimester. If the patient desires screening for open neural tube defects, maternal serum AFP only is recommended, ideally between 16-18 weeks gestation.    4. Johnna had a nuchal translucency ultrasound today. Please see the ultrasound report for further details.    5. Further recommendations include a fetal anatomy level II ultrasound with [facility]. The upcoming ultrasound has been scheduled for 2023.    PREGNANCY HISTORY   /Parity:       Johnna's pregnancy history is significant for two term deliveries, both healthy males    CURRENT PREGNANCY   Current Age: 38 year old   Age at Delivery: 38 year old  SANCHEZ: 2024, by Last Menstrual Period                               "     Gestational Age: 12w5d  This pregnancy is a single gestation.   This pregnancy was conceived spontaneously.    MEDICAL HISTORY   Johnna s reported medical history is not expected to impact pregnancy management or risks to fetal development.       FAMILY HISTORY   A three-generation pedigree was obtained today and is scanned under the \"Media\" tab in Epic. The family history was reported by Johnna.    The following significant findings were reported today:   The father of the pregnancy, 37, is healthy. He and Johnna share two healthy sons.     The father of the pregnancy had two siblings that were stillbirth or  shortly after birth in [place]. Johnna is unsure of the details. He also has healthy brothers.   Family history such as stillbirths, infant deaths, or pregnancy complications can be difficult to assess if thisoccurred many decades ago since details are typically scarce. Therefore, it is challenging to assess if this family history modifies risks to the current pregnancy. This is often unlikely, especially if the history isseveral generations away from the current pregnancy. If more information is collected regarding this family history it should be revisited.      Otherwise, the reported family history is unremarkable for multiple miscarriages, stillbirths, birth defects, intellectual disabilities, known genetic conditions, and consanguinity.       RISK ASSESSMENT FOR INHERITED CONDITIONS AND CARRIER SCREENING OPTIONS   Expanded carrier screening is available to screen for autosomal recessive conditions and X-linked conditions in a large list of genes. Carrier screening does not test the pregnancy but gives a risk assessment for the pregnancy and future pregnancies to have the condition. Expanded carrier screening is designed to identify carrier status for conditions that are primarily childhood or adolescent onset. Expanded carrier screening does not evaluate for adult-onset conditions such " as hereditary cancer syndromes, dementia/ Alzheimer's disease, or cardiovascular disease risk factors. Additionally, expanded carrier screening is not comprehensive for all known genetic diseases or inherited conditions. Carrier screening does not test for all genetic and health conditions or risk factors.     Autosomal recessive conditions happen when a mutation has been inherited from the egg and sperm and include conditions like cystic fibrosis, thalassemia, hearing loss, spinal muscular atrophy, and more. We reviewed that when both biological parents carry a harmful genetic change in a gene associated with autosomal recessive inheritance, each of their pregnancies has a 1 in 4 (25%) chance to be affected by that condition. X-linked conditions happen when a mutation has been inherited from the egg and include conditions like fragile X syndrome.With x-linked conditions, the specific risk generally depends on the chromosomal sex of the fetus, with XY individuals (generally male) being most severely affected.      screening was reviewed. About [place]  Screening    The patient does NOT have a family history of known inherited conditions. This does NOT mean the patient and/or their partner is not a carrier of a condition. Approximately 90% of couples at an increased reproductive risk for an inherited condition have no family history of that condition.     The patient has not had carrier screening previously.     The patient declined the carrier screening options and declined a thorough discussion of the options. They are aware the option will remain, and they can contact us if they would like to pursue screening. See below for the more detailed information we dicussed.      RISK ASSESSMENT FOR CHROMOSOME CONDITIONS   We explained that the risk for fetal chromosome abnormalities increases with maternal age. We discussed specific features of common chromosome abnormalities, including Down syndrome, trisomy 13,  trisomy 18, and sex chromosome trisomies.    At age 38 at midtrimester, the risk to have a baby with Down syndrome is 1 in 216.   At age 38 at midtrimester, the risk to have a baby with any chromosome abnormality is 1 in 105.     Johnna has not had genetic screening in this pregnancy but elected to have screening today.      GENETIC TESTING OPTIONS   Genetic testing during a pregnancy includes screening and diagnostic procedures.      Screening tests are non-invasive which means no risk to the pregnancy and includes ultrasounds and blood work. The benefits and limitations of screening were reviewed. Screening tests provide a risk assessment (chance) specific to the pregnancy for certain fetal chromosome abnormalities but cannot definitively diagnose or exclude a fetal chromosome abnormality. Follow-up genetic counseling and consideration of diagnostic testing is recommended with any abnormal screening result. Diagnostic testing during a pregnancy is more certain and can test for more conditions. However, the tests do have a risk of miscarriage that requires careful consideration. These tests can detect fetal chromosome abnormalities with greater than 99% certainty. Results can be compromised by maternal cell contamination or mosaicism and are limited by the resolution of current genetic testing technology.     There is no screening or diagnostic test that detects all forms of birth defects or intellectual disability.     We discussed the following screening options:     Non-invasive prenatal testing (NIPT)  Also called cell-free DNA screening because it detects chromosomes from the placenta in the pregnant person's blood  Can be done any time after 10 weeks gestation  Standard recommendation for NIPT screens for trisomy 21, trisomy 18, trisomy 13, with the option of adding sex chromosome aneuploidies, without or without predicted sex  Cannot screen for open neural tube defects, maternal serum AFP after 15 weeks is  recommended  New NIPT options include screening for other trisomies, microdeletion syndromes, and in some cases fetal blood antigens. Guidelines do not recommend these conditions are included in standard screening. These options have limitations and should be discussed with a genetic counselor.   However, current (2023) ACMG guidelines do recommend that screening for one microdeletion syndrome, called 22q11.2 deletion syndrome be offered to all pregnant patients. 22q11.2 deletion syndrome has an estimated prevalence of 1 in 990 to 1 in 2148 (0.05-0.1%). Risk is not thought to increase with maternal age. Clinical features are variable but include congenital heart defects, cleft palate, developmental delays, immune system deficiencies, and hearing loss. Approximately 90% of cases are de johnny (a sporadic new change in a pregnancy). Cell-free DNA screening for 22q11.2 deletion syndrome is available (Expanded NIPS through [employer]). We discussed the limitations of cell-free DNA screening in detecting microdeletions and the possiblity of false positives and false negatives. Expanded NIPS also allows for reflex to include other microdeletion conditions and rare autosomal trisomies if an indication would arise later in the pregnancy. The patient declined 22q11.2 deletion syndrome screening.     AFP only  Blood work from arm for levels of AFP (alpha-fetoprotein)  Can be done between 14w1d and 23w6d gestation  Screens for open neural tube defects like spina bifida  Recommended for those that do not want genetic testing (for chromosome conditions), those who did screening other than the quad screen, and those with a personal or family history of neural tube defects.    We discussed the following ultrasound options:    Nuchal translucency (NT) ultrasound  Ultrasound between 10s9h-20n1o that includes nuchal translucency measurement and nasal bone assessments  Nuchal translucency refers to the space at the back of the  neck where fluid builds up. All babies at this stage have fluid and there is only concern if there is too much fluid  Nasal bone refers to the small bone in the nose. There is concern for conditions like Down syndrome if the bone cannot be seen at all  This ultrasound can be done as part of first trimester screening, at the same time as another screen (NIPT), at the same time as a CVS, or if the patients does not want genetic screening.  Markers on ultrasound detects about 70% of pregnancies with aneuploidy  Abnormalities on NT ultrasound can also increase the risk for a birth defect, like a heart defect    Comprehensive level II ultrasound (Fetal Anatomy Ultrasound)  Ultrasound done between 18-20 weeks gestation  Screens for major birth defects and markers for aneuploidy (like trisomy 21 and trisomy 18)  Includes looking at the fetus/baby's growth, heart, organs (stomach, kidneys), placenta, and amniotic fluid    We discussed the following diagnostic options:     Chorionic villus sampling (CVS)  Invasive diagnostic procedure done between 10w0d and 13w6d  The procedure collects a small sample from the placenta for the purpose of chromosomal testing and/or other genetic testing  Diagnostic result; more than 99% sensitivity for fetal chromosome abnormalities  Cannot screen for open neural tube defects, maternal serum AFP after 15 weeks is recommended    Amniocentesis  Invasive diagnostic procedure done after 15 weeks gestation  The procedure collects a small sample of amniotic fluid for the purpose of chromosomal testing and/or other genetic testing  Diagnostic result; more than 99% sensitivity for fetal chromosome abnormalities  Testing for AFP in the amniotic fluid can test for open neural tube defects    It was a pleasure to be involved with [facility]. Face-to-face time of the meeting was 30 minutes.    Nimco Contreras GC, MS, [facility]  Board Certified and [place] Licensed Genetic Counselor   [facility]   Maternal Fetal Medicine  Office: [phone]  [facility]: [phone]   Fax: [phone]  [facility]

## 2023-10-04 NOTE — NURSING NOTE
Johnna Maher is a  at 12w5d who presents to [facility] for scheduled nuchal translucency ultrasound with genetic counseling. SBAR given to Dr. NAKUL Dugan, see note in Epic.

## 2023-10-09 ENCOUNTER — TELEPHONE (OUTPATIENT)
Dept: MATERNAL FETAL MEDICINE | Facility: CLINIC | Age: 38
End: 2023-10-09

## 2023-10-09 LAB — SCANNED LAB RESULT: NORMAL

## 2023-11-02 ENCOUNTER — HOSPITAL ENCOUNTER (OUTPATIENT)
Dept: ULTRASOUND IMAGING | Facility: CLINIC | Age: 38
Discharge: HOME | End: 2023-11-02
Attending: OBSTETRICS & GYNECOLOGY
Payer: COMMERCIAL

## 2023-11-02 ENCOUNTER — OFFICE VISIT (OUTPATIENT)
Dept: MATERNAL FETAL MEDICINE | Facility: CLINIC | Age: 38
End: 2023-11-02
Attending: OBSTETRICS & GYNECOLOGY

## 2023-11-02 DIAGNOSIS — O09.522 MULTIGRAVIDA OF ADVANCED MATERNAL AGE IN SECOND TRIMESTER: ICD-10-CM

## 2023-11-02 DIAGNOSIS — O09.522 MULTIGRAVIDA OF ADVANCED MATERNAL AGE IN SECOND TRIMESTER: Primary | ICD-10-CM

## 2023-11-02 DIAGNOSIS — Z36.82 NUCHAL TRANSLUCENCY OF FETUS ON PRENATAL ULTRASOUND: ICD-10-CM

## 2023-11-02 PROCEDURE — 76811 OB US DETAILED SNGL FETUS: CPT

## 2023-11-02 PROCEDURE — 76811 OB US DETAILED SNGL FETUS: CPT | Mod: 26 | Performed by: OBSTETRICS & GYNECOLOGY

## 2023-11-02 NOTE — PROGRESS NOTES
"Please see \"Imaging\" tab under \"Chart Review\" for details of today's US at the [facility].    Scott Maloney MD  Maternal-Fetal Medicine    "

## 2023-11-02 NOTE — NURSING NOTE
Patient here for L2 ultrasound. Denies questions or concerns today. SBAR given to KVEIN HILL, see their note in Epic.

## 2023-11-20 ENCOUNTER — PRENATAL OFFICE VISIT (OUTPATIENT)
Dept: OBGYN | Facility: CLINIC | Age: 38
End: 2023-11-20
Payer: COMMERCIAL

## 2023-11-20 VITALS
OXYGEN SATURATION: 99 % | DIASTOLIC BLOOD PRESSURE: 76 MMHG | HEART RATE: 101 BPM | WEIGHT: 211.4 LBS | BODY MASS INDEX: 34.8 KG/M2 | SYSTOLIC BLOOD PRESSURE: 109 MMHG

## 2023-11-20 DIAGNOSIS — O09.529 ANTEPARTUM MULTIGRAVIDA OF ADVANCED MATERNAL AGE: Primary | ICD-10-CM

## 2023-11-20 PROCEDURE — 99207 PR PRENATAL VISIT: CPT | Performed by: OBSTETRICS & GYNECOLOGY

## 2023-11-20 NOTE — PROGRESS NOTES
"21w0d  Doing well without issues/concerns.  Routine anticipatory guidance.  MFMUS was normal.  RTC 4wk.  Plan for  GCT, Hgb.   Vital signs:      BP: 109/76 Pulse: 101     SpO2: 99 %       Weight: 95.9 kg (211 lb 6.4 oz)  Estimated body mass index is 34.8 kg/m  as calculated from the following:    Height as of 8/22/23: 1.66 m (5' 5.35\").    Weight as of this encounter: 95.9 kg (211 lb 6.4 oz).         ICD-10-CM    1. Antepartum multigravida of advanced maternal age  O09.529 Glucose tolerance gest screen 1 hour     ABO and Rh     Hemoglobin        CEPHAS AGBEH, MD.    "

## 2023-11-20 NOTE — PATIENT INSTRUCTIONS
To Schedule an Appointment 24/7  Call: [phone]    If you have any questions regarding your visit, Please contact your care team.  [facility]: [phone]  [facility] HOURS TELEPHONE NUMBER   Cephas Agbeh, M.D.      Bryanna Whitten-Surgery Scheduler  Amie-Surgery Scheduler       Monday - [place]:    8:00 am-4:45 pm  Tuesday - [place]:   8:00 am-4:45 pm  Friday-[place]:       8:00 am-4:45 pm  Typical Surgery Day:  Wednesday [facility]   [address] [place] [address]   [phone]   Fax [phone]    Imaging Scheduling all locations  [phone]      [facility]   [address] [place] [address] [place] [address]   [phone]    [facility]  [address] [facility] [address]  [phone]  Fax [phone]   Urgent Care locations:  [facility] Monday-Friday                               10 am - 8 pm  Saturday and Sunday                      9 am - 5 pm  Monday-Friday                              10 am- 8 pm  Saturday and Sunday                      9 am - 5 pm    [phone]   **Surgeries** Our Surgery Schedulers will contact you to schedule. If you do not receive a call within 3 business days, please call [phone].  If you need a medication refill, please contact your pharmacy. Please allow 3 business days for your refill to be completed.  As always, Thank you for trusting us with your healthcare needs!  see additional instructions from your care team below

## 2023-12-18 ENCOUNTER — PRENATAL OFFICE VISIT (OUTPATIENT)
Dept: OBGYN | Facility: CLINIC | Age: 38
End: 2023-12-18

## 2023-12-18 VITALS
OXYGEN SATURATION: 100 % | DIASTOLIC BLOOD PRESSURE: 73 MMHG | SYSTOLIC BLOOD PRESSURE: 119 MMHG | BODY MASS INDEX: 34.86 KG/M2 | HEART RATE: 108 BPM | WEIGHT: 211.8 LBS

## 2023-12-18 DIAGNOSIS — O09.529 ANTEPARTUM MULTIGRAVIDA OF ADVANCED MATERNAL AGE: Primary | ICD-10-CM

## 2023-12-18 DIAGNOSIS — D25.9 UTERINE LEIOMYOMA, UNSPECIFIED LOCATION: ICD-10-CM

## 2023-12-18 LAB
ABO/RH(D): NORMAL
GLUCOSE 1H P 50 G GLC PO SERPL-MCNC: 108 MG/DL (ref 70–129)
HGB BLD-MCNC: 10.4 G/DL (ref 11.7–15.7)
SPECIMEN EXPIRATION DATE: NORMAL

## 2023-12-18 PROCEDURE — 36415 COLL VENOUS BLD VENIPUNCTURE: CPT | Performed by: OBSTETRICS & GYNECOLOGY

## 2023-12-18 PROCEDURE — 82950 GLUCOSE TEST: CPT | Performed by: OBSTETRICS & GYNECOLOGY

## 2023-12-18 PROCEDURE — 85018 HEMOGLOBIN: CPT | Performed by: OBSTETRICS & GYNECOLOGY

## 2023-12-18 PROCEDURE — 86901 BLOOD TYPING SEROLOGIC RH(D): CPT | Performed by: OBSTETRICS & GYNECOLOGY

## 2023-12-18 PROCEDURE — 86900 BLOOD TYPING SEROLOGIC ABO: CPT | Performed by: OBSTETRICS & GYNECOLOGY

## 2023-12-18 PROCEDURE — 99207 PR PRENATAL VISIT: CPT | Performed by: OBSTETRICS & GYNECOLOGY

## 2023-12-18 NOTE — PROGRESS NOTES
"25w0d  Doing well without issues/concerns.  Routine anticipatory guidance.  MFMUS was normal.  RTC 4wk.  Glucola given. Plan for  GCT, Hgb.   Vital signs:      BP: 119/73 Pulse: 108     SpO2: 100 %       Weight: 96.1 kg (211 lb 12.8 oz)  Estimated body mass index is 34.86 kg/m  as calculated from the following:    Height as of 8/22/23: 1.66 m (5' 5.35\").    Weight as of this encounter: 96.1 kg (211 lb 12.8 oz).     RECOMMENDATION  ---------------------------------------------------------------------------------------------------------  We discussed the findings on today's ultrasound with the patient.     Further ultrasound studies as clinically indicated. Return to primary provider for continued prenatal care.     Thank-you for the opportunity to participate in the care of this patient. If you have questions regarding today's evaluation or if we can be of further service, please contact the  [facility] Medicine Center.     **Fetal anomalies may be present but not detected**                                                                         IMPRESSION  ---------------------------------------------------------------------------------------------------------  1) Sonographic biometry agrees with gestational age predicted by assigned SANCHEZ.  2) The fetal anatomy was adequately visualized and appeared normal.  3) None of the anomalies commonly detected by ultrasound were evident.  4) No markers for aneuploidy seen.  5) Small uterine myoma with location and dimensions as noted above.       ICD-10-CM    1. Antepartum multigravida of advanced maternal age  O09.529         CEPHAS AGBEH, MD.    "

## 2024-01-16 ENCOUNTER — PRENATAL OFFICE VISIT (OUTPATIENT)
Dept: OBGYN | Facility: CLINIC | Age: 39
End: 2024-01-16
Payer: COMMERCIAL

## 2024-01-16 VITALS
SYSTOLIC BLOOD PRESSURE: 120 MMHG | DIASTOLIC BLOOD PRESSURE: 78 MMHG | HEART RATE: 69 BPM | WEIGHT: 215.2 LBS | OXYGEN SATURATION: 94 % | BODY MASS INDEX: 35.42 KG/M2

## 2024-01-16 DIAGNOSIS — Z23 NEED FOR TDAP VACCINATION: ICD-10-CM

## 2024-01-16 DIAGNOSIS — O99.011 ANEMIA COMPLICATING PREGNANCY, FIRST TRIMESTER: ICD-10-CM

## 2024-01-16 DIAGNOSIS — O09.529 ANTEPARTUM MULTIGRAVIDA OF ADVANCED MATERNAL AGE: Primary | ICD-10-CM

## 2024-01-16 DIAGNOSIS — D25.9 UTERINE LEIOMYOMA, UNSPECIFIED LOCATION: ICD-10-CM

## 2024-01-16 PROBLEM — Z97.5 IUD (INTRAUTERINE DEVICE) IN PLACE: Status: RESOLVED | Noted: 2021-12-27 | Resolved: 2024-01-16

## 2024-01-16 PROCEDURE — 90715 TDAP VACCINE 7 YRS/> IM: CPT | Performed by: GENERAL PRACTICE

## 2024-01-16 PROCEDURE — 90471 IMMUNIZATION ADMIN: CPT | Performed by: GENERAL PRACTICE

## 2024-01-16 PROCEDURE — 99207 PR PRENATAL VISIT: CPT | Performed by: GENERAL PRACTICE

## 2024-01-16 RX ORDER — FERROUS SULFATE 325(65) MG
325 TABLET ORAL
Qty: 90 TABLET | Refills: 1 | Status: SHIPPED | OUTPATIENT
Start: 2024-01-16

## 2024-01-16 NOTE — PATIENT INSTRUCTIONS
If you have labs or imaging done, the results will automatically release in [employer] without an interpretation.  Your health care professional will review those results and send an interpretation with recommendations as soon as possible, but this may be 1-3 business days.    If you have any questions regarding your visit, please contact your care team.     [employer] [facility]: [phone]  [facility] CLINIC HOURS TELEPHONE NUMBER   Juanjose CARVER Garrison .      Madelyn-EMILIA Urbina-EMILIA Whitten-Surgery Scheduler  Amie-         Monday-[place]  8:00 am-4:00 pm  [facility]  [facility]4:00 pm  Wednesday-[place] 8:00 am-4:00 pm  Thursday-[place] 8:00 am-4:00 pm    Typical Surgery Day Friday [facility]  [address] [place] [address]  PH: [phone] Fax    Imaging Scheduling all locations  PH: [phone]     [facility] Delivery  [address]  [place], [place] [address] [phone]    [facility]  [address] [place] [address]  PH: [phone]     **Surgeries** Our Surgery Schedulers will contact you to schedule. If you do not receive a call within 3 business days, please call [phone].  Urgent Care locations:  [facility]       Monday-Friday   10 am - 8 pm  Saturday and Sunday   9 am - 5 pm   [phone]   If you need a medication refill, please contact your pharmacy. Please allow 3 business days for your refill to be completed.  As always, Thank you for trusting us with your healthcare needs!

## 2024-02-19 ENCOUNTER — PRENATAL OFFICE VISIT (OUTPATIENT)
Dept: OBGYN | Facility: CLINIC | Age: 39
End: 2024-02-19
Payer: COMMERCIAL

## 2024-02-19 VITALS
SYSTOLIC BLOOD PRESSURE: 111 MMHG | BODY MASS INDEX: 35.36 KG/M2 | HEART RATE: 99 BPM | OXYGEN SATURATION: 100 % | WEIGHT: 214.8 LBS | DIASTOLIC BLOOD PRESSURE: 78 MMHG

## 2024-02-19 DIAGNOSIS — D25.9 UTERINE LEIOMYOMA, UNSPECIFIED LOCATION: Primary | ICD-10-CM

## 2024-02-19 DIAGNOSIS — O09.529 ANTEPARTUM MULTIGRAVIDA OF ADVANCED MATERNAL AGE: ICD-10-CM

## 2024-02-19 PROCEDURE — 99207 PR PRENATAL VISIT: CPT | Performed by: OBSTETRICS & GYNECOLOGY

## 2024-02-19 NOTE — PROGRESS NOTES
"34w0d  No HA, visual changes, N/V etc. patient says she is relocating to [place] on 27 February 2024.. Routine AG. See flowsheet. RTC 1 wk/prn.  Fundal height is measuring 2 weeks ahead.  Will obtain a growth ultrasound..  Vital signs:      BP: 111/78 Pulse: 99     SpO2: 100 %       Weight: 97.4 kg (214 lb 12.8 oz)  Estimated body mass index is 35.36 kg/m  as calculated from the following:    Height as of 8/22/23: 1.66 m (5' 5.35\").    Weight as of this encounter: 97.4 kg (214 lb 12.8 oz).        ICD-10-CM    1. Uterine leiomyoma, unspecified location  D25.9 US OB Follow Up >14 Weeks      2. Antepartum multigravida of advanced maternal age  O09.529 US OB Follow Up >14 Weeks        CEPHAS AGBEH, MD.    "

## 2024-02-19 NOTE — PATIENT INSTRUCTIONS
To Schedule an Appointment 24/7  Call: [phone]    If you have any questions regarding your visit, Please contact your care team.  [facility]: [phone]  [facility] CLINIC HOURS TELEPHONE NUMBER   Cephas Agbeh, M.D.      Bryanna Whitten-Surgery Scheduler  Amie-Surgery Scheduler       Monday - [place]:    8:00 am-4:45 pm  Tuesday - [place]:   8:00 am-4:45 pm  Friday-[place]:       8:00 am-4:45 pm  Typical Surgery Day:  Wednesday [facility]   [address] [place] [address]   [phone]   Fax [phone]    Imaging Scheduling all locations  [phone]      [facility] Labor and Delivery   [address] [place] [address] [place] [address]   [phone]    [facility]  [address] [facility] [address]  [phone]  Fax [phone]   Urgent Care locations:  [facility] Monday-Friday                               10 am - 8 pm  Saturday and Sunday                      9 am - 5 pm  Monday-Friday                              10 am- 8 pm  Saturday and Sunday                      9 am - 5 pm    [phone]   **Surgeries** Our Surgery Schedulers will contact you to schedule. If you do not receive a call within 3 business days, please call [phone].  If you need a medication refill, please contact your pharmacy. Please allow 3 business days for your refill to be completed.  As always, Thank you for trusting us with your healthcare needs!  see additional instructions from your care team below   Weeks 34 to 36 of Your Pregnancy: Care Instructions  Your belly has grown quite large. It's almost time to give birth! Your baby's lungs are almost ready to breathe air. The skull bones are firm enough to protect your baby's head. But they're soft enough to move down through the birth canal.    You might be wondering what to expect during labor. Because each  "birth is different, there's no way to know exactly what childbirth will be like for you. Talk to your doctor or midwife about any concerns you have.   You'll probably have a test for group B streptococcus (GBS). GBS is bacteria that can live in the vagina and rectum. GBS can make your baby sick after birth. If you test positive, you'll get antibiotics during labor.     Choose what type of pain relief you would like during labor.  You can choose from a few types, including medicine and non-medicine options. You may want to use several types of pain relief.     Know how medicines can help with pain during labor.  Some medicines lower anxiety and help with some of the pain. Others make your lower body numb so that you will feel less pain.     Tell your doctor about your pain medicine choice.  Do this before you start labor or very early in your labor. You may be able to change your mind during labor.     Learn about the stages of labor.    The first stage includes the early (latent) and active phases of labor. Contractions start in early labor. During active labor, contractions get stronger, last longer, and happen more often. And the cervix opens more rapidly.  The second stage starts when you're ready to push. During this stage, your baby is born.  During the third stage, you'll usually have a few more contractions to push out the placenta.   Follow-up care is a key part of your treatment and safety. Be sure to make and go to all appointments, and call your doctor if you are having problems. It's also a good idea to know your test results and keep a list of the medicines you take.  Where can you learn more?  Go to https://www.[employer].net/patiented  Enter B912 in the search box to learn more about \"Weeks 34 to 36 of Your Pregnancy: Care Instructions.\"  Current as of: July 11, 2023               Content Version: 13.8    7576-0855 [employer].   Care instructions adapted under license by your healthcare " professional. If you have questions about a medical condition or this instruction, always ask your healthcare professional. [employer], [facility] disclaims any warranty or liability for your use of this information.      Group B Strep During Pregnancy: Care Instructions  Overview     Group B strep infection is caused by a type of bacteria. It's a different kind of bacteria than the kind that causes strep throat.  You may have this kind of bacteria in your body. Sometimes it may cause an infection, but most of the time it doesn't make you sick or cause symptoms. But if you pass the bacteria to your baby during the birth, it can cause serious health problems for your baby.  If you have this bacteria in your body, you will get antibiotics when you are in labor. Antibiotics help prevent problems for a  baby.  After birth, doctors will watch and may test your baby. If your baby tests positive for Group B strep, your baby will get antibiotics.  If you plan to breastfeed your baby, don't worry. It will be safe to breastfeed.  Follow-up care is a key part of your treatment and safety. Be sure to make and go to all appointments, and call your doctor if you are having problems. It's also a good idea to know your test results and keep a list of the medicines you take.  How can you care for yourself at home?  If your doctor has prescribed antibiotics, take them as directed. Do not stop taking them just because you feel better. You need to take the full course of antibiotics.  Tell your doctor if you are allergic to any antibiotic.  If you go into labor, or your water breaks, go to the hospital. Your doctor will give you antibiotics to help protect your baby from infection.  Tell the doctors and nurses if you have an allergy to penicillin.  Tell the doctors and nurses at the hospital that you tested positive for group B strep.  When should you call for help?   Call your doctor now or seek immediate medical care if:     "You have symptoms of a urinary tract infection. These may include:  Pain or burning when you urinate.  A frequent need to urinate without being able to pass much urine.  Pain in the flank, which is just below the rib cage and above the waist on either side of the back.  Blood in your urine.  A fever.     You think you are in labor or your water has broken.     You have pain in your belly or pelvis.   Watch closely for changes in your health, and be sure to contact your doctor if you have any problems.  Where can you learn more?  Go to https://www.[employer].net/patiented  Enter M001 in the search box to learn more about \"Group B Strep During Pregnancy: Care Instructions.\"  Current as of: June 13, 2023               Content Version: 13.8    2963-9694 [employer].   Care instructions adapted under license by your healthcare professional. If you have questions about a medical condition or this instruction, always ask your healthcare professional. [employer] disclaims any warranty or liability for your use of this information.      "

## 2024-11-30 ENCOUNTER — HEALTH MAINTENANCE LETTER (OUTPATIENT)
Age: 39
End: 2024-11-30

## (undated) RX ORDER — LIDOCAINE HYDROCHLORIDE 40 MG/ML
SOLUTION TOPICAL
Status: DISPENSED
Start: 2019-01-02

## (undated) RX ORDER — FENTANYL CITRATE 50 UG/ML
INJECTION, SOLUTION INTRAMUSCULAR; INTRAVENOUS
Status: DISPENSED
Start: 2019-01-02

## (undated) RX ORDER — ALBUTEROL SULFATE 0.83 MG/ML
SOLUTION RESPIRATORY (INHALATION)
Status: DISPENSED
Start: 2019-01-02

## (undated) RX ORDER — DIPHENHYDRAMINE HYDROCHLORIDE 50 MG/ML
INJECTION INTRAMUSCULAR; INTRAVENOUS
Status: DISPENSED
Start: 2019-01-02